# Patient Record
Sex: FEMALE | Race: WHITE | Employment: OTHER | URBAN - METROPOLITAN AREA
[De-identification: names, ages, dates, MRNs, and addresses within clinical notes are randomized per-mention and may not be internally consistent; named-entity substitution may affect disease eponyms.]

---

## 2017-01-18 ENCOUNTER — OFFICE VISIT (OUTPATIENT)
Dept: ORTHOPEDIC SURGERY | Age: 72
End: 2017-01-18

## 2017-01-18 VITALS
HEIGHT: 62 IN | HEART RATE: 61 BPM | DIASTOLIC BLOOD PRESSURE: 65 MMHG | SYSTOLIC BLOOD PRESSURE: 126 MMHG | BODY MASS INDEX: 35.33 KG/M2 | WEIGHT: 192 LBS | RESPIRATION RATE: 18 BRPM

## 2017-01-18 DIAGNOSIS — M54.50 LOW BACK PAIN WITHOUT SCIATICA, UNSPECIFIED BACK PAIN LATERALITY, UNSPECIFIED CHRONICITY: Primary | ICD-10-CM

## 2017-01-18 DIAGNOSIS — M54.14 THORACIC RADICULITIS: ICD-10-CM

## 2017-01-18 DIAGNOSIS — M54.17 LUMBOSACRAL RADICULITIS: ICD-10-CM

## 2017-01-18 DIAGNOSIS — M96.1 POSTLAMINECTOMY SYNDROME, LUMBAR: ICD-10-CM

## 2017-01-18 DIAGNOSIS — M54.17 THORACIC AND LUMBOSACRAL NEURITIS: ICD-10-CM

## 2017-01-18 DIAGNOSIS — M54.14 THORACIC AND LUMBOSACRAL NEURITIS: ICD-10-CM

## 2017-01-18 RX ORDER — PREGABALIN 50 MG/1
50 CAPSULE ORAL 2 TIMES DAILY
Qty: 60 CAP | Refills: 1 | Status: SHIPPED | OUTPATIENT
Start: 2017-01-18 | End: 2017-08-11 | Stop reason: SDUPTHER

## 2017-01-18 RX ORDER — ACETAMINOPHEN/DIPHENHYDRAMINE 500MG-25MG
TABLET ORAL
COMMUNITY

## 2017-01-18 RX ORDER — PREGABALIN 50 MG/1
50 CAPSULE ORAL 2 TIMES DAILY
Qty: 14 CAP | Refills: 0 | Status: SHIPPED | OUTPATIENT
Start: 2017-01-18

## 2017-01-18 NOTE — PROGRESS NOTES
[facility] SPECIALISTS  [address], [place]   Phone: [phone]  Fax: [phone]        PROGRESS NOTE      HISTORY OF PRESENT ILLNESS:  The patient is a 70 y.o. female and was seen today for follow up of low back pain extending laterally into the LLE to the knee an din the RLE extending anteriorly to the knee. This is a patient with lumbar postlaminectomy syndrome with a previous hx of L3-S1 fusion performed by Dr. Oj Melo on 8/22/2011. She underwent right shoulder replacement in May and attended therapy for this. Overall, her mobility has been decreased since and reports she has had other issues that quite frankly have distracted her. At her last clinical appointment,  the patient declined additional treatment. She simply wanted to heal up from her shoulder surgery prior to pursuing additional treatment on her lumbar spine. The patient returns today with chronic low back pain into the BLE extending in roughly an L4/L5 distribution to the knee that has progressed. She rates pain 5-9/10. Pain is exacerbated with standing and walking. Symptoms consistent for stenosis. I last saw patient on 7/25/14 with c/o low back pain into the BLE. Patient is currently taking NEURONTIN 300 mg 1 tab qhs but is unable to tolerate higher doses of medication. She is also prescribed Cymbalta 60 mg. She is not a candidate for Topamax due to hx of glaucoma. She reports her left knee is due for replacement which is being followed by Dr. Lizabeth Sagastume. She has hx of rheumatoid arthritis and is on chronic Prednisone. Patient is followed by Dr. Sharrie Cranker. Patient denies change in bowel or bladder habits.  reviewed.        Past Medical History   Diagnosis Date    Anxiety     Arthritis     Autoimmune disease ([address]      rheumatoid arthritis    Bronchiectasis ([address]     Cataract     Depression     Glaucoma     Hypertension     Stroke [facility])         Social History     Social History    Marital status:      Spouse name: N/A    Number of children: N/A    Years of education: N/A     Occupational History    Not on file. Social History Main Topics    Smoking status: Former Smoker     Packs/day: 1.00     Years: 25.00     Types: Cigarettes     Quit date: 4/3/1996    Smokeless tobacco: Never Used    Alcohol use No    Drug use: No    Sexual activity: Not on file     Other Topics Concern    Not on file     Social History Narrative       Current Outpatient Prescriptions   Medication Sig Dispense Refill    diphenhydrAMINE-acetaminophen (TYLENOL PM EXTRA STRENGTH)  mg tab Take  by mouth.  pregabalin (LYRICA) 50 mg capsule Take 1 Cap by mouth two (2) times a day. Max Daily Amount: 100 mg. 60 Cap 1    pregabalin (LYRICA) 50 mg capsule Take 1 Cap by mouth two (2) times a day. Max Daily Amount: 100 mg. 14 Cap 0    amLODIPine (NORVASC) 5 mg tablet       metoprolol succinate (TOPROL-XL) 100 mg tablet       certolizumab pegol (CIMZIA) 400 mg/2 mL (200 mg/mL x 2) sykt injection by SubCUTAneous route once.  METHOTREXATE by Does Not Apply route. 6 tabs weekly      predniSONE (DELTASONE) 5 mg tablet Take  by mouth daily.  HYDROcodone-acetaminophen (NORCO) 5-325 mg per tablet Take  by mouth.  DORZOLAMIDE HCL/TIMOLOL MALEAT (COSOPT OP) Apply 1 Drop to eye two (2) times a day. Right eye      latanoprost (XALATAN) 0.005 % ophthalmic solution Administer 1 Drop to right eye nightly.  gabapentin (NEURONTIN) 300 mg capsule Take 300 mg by mouth three (3) times daily.  omeprazole (PRILOSEC) 40 mg capsule Take 40 mg by mouth daily.  losartan (COZAAR) 100 mg tablet Take 100 mg by mouth daily.  pravastatin (PRAVACHOL) 40 mg tablet Take 40 mg by mouth nightly.  cloNIDine (CATAPRES) 0.1 mg tablet Take  by mouth two (2) times a day.  DULoxetine (CYMBALTA) 60 mg capsule Take 60 mg by mouth daily.  aspirin 81 mg tablet Take 81 mg by mouth.       CHOLECALCIFEROL, VITAMIN D3, (DIALYVITE VITAMIN D PO) Take  by mouth.  albuterol (PROVENTIL HFA, VENTOLIN HFA, PROAIR HFA) 90 mcg/actuation inhaler Take  by inhalation.  bimatoprost (LUMIGAN) 0.03 % ophthalmic drops Administer 1 Drop to both eyes every evening. Allergies   Allergen Reactions    Combigan [Brimonidine-Timolol] Other (comments)     redness and irritation in eyes    Enbrel [Etanercept] Rash and Swelling    Morphine Unknown (comments)     Makes patient sick        Ambulates with a single point cane. Examined in a wheelchair. PHYSICAL EXAMINATION    Visit Vitals    /65 (BP 1 Location: Right arm, BP Patient Position: Sitting)    Pulse 61    Resp 18    Ht 5' 2\" (1.575 m)    Wt 192 lb (87.1 kg)    BMI 35.12 kg/m2       CONSTITUTIONAL: NAD, A&O x 3  SENSATION: . Hypersensitivty to light touch bilateral lateral thighs. .Intact to light touch throughout  RANGE OF MOTION: The patient has full passive range of motion in all four extremities. MOTOR:  Straight Leg Raise: Negative, bilateral   EXTREMITIES: Arthritic changes, bilateral hands. Limited GTE, left (previous fusion)             Hip Flex Knee Ext Knee Flex Ankle DF GTE Ankle PF Tone   Right +4/5 +4/5 +4/5 +4/5 +4/5 +4/5 +4/5   Left +4/5 +4/5 +4/5 +4/5 Limited  +4/5 +4/5     RADIOGRAPHS  Preliminary reading of lumbar plain films:  Posterior fusion L3-S1. Hardware intact. Fusion appears to be solid. ASSESSMENT   April Payton was seen today for back pain, leg pain and knee pain.     Diagnoses and all orders for this visit:    Low back pain with sciatica, unspecified back pain laterality, unspecified chronicity  -     [68406] L/S 2-3 views  -     MRI LUMB SPINE W WO CONT; Future    Postlaminectomy syndrome, lumbar  -     MRI LUMB SPINE W WO CONT; Future    Thoracic and lumbosacral neuritis  -     MRI LUMB SPINE W WO CONT; Future    Thoracic radiculitis  -     MRI LUMB SPINE W WO CONT; Future    Lumbosacral radiculitis  - MRI LUMB SPINE W WO CONT; Future    Other orders  -     pregabalin (LYRICA) 50 mg capsule; Take 1 Cap by mouth two (2) times a day. Max Daily Amount: 100 mg.  -     pregabalin (LYRICA) 50 mg capsule; Take 1 Cap by mouth two (2) times a day. Max Daily Amount: 100 mg. IMPRESSION AND PLAN:  The patient returns today with similar complaints of chronic low back and radicular symptoms as from when I saw her in July 2014. I will set her up for a lumbar spine MRI. I advised patient to bring copies of films to next visit. In the interim, she will d/c Neurontin 300 mg 1 tab qhs and start Lyrica 50 mg BID. The risks, benefits, and potential side effects of this medication were discussed. Patient understands and wished to proceed. Patient advised to call the office if intolerant to new medication. I have given her a prescription for a rollator. I will see the patient back following MRI. Written by Siobhan Story, as dictated by Vinayak Verduzco MD  I examined the patient, reviewed and agree with the note.

## 2017-01-18 NOTE — MR AVS SNAPSHOT
Visit Information Date & Time Provider Department Dept. Phone Encounter #  
 1/18/2017  3:20 PM Tennille Gill MD [facility] [place] [phone] [id number] Follow-up Instructions Return for following MRI. Follow-up and Disposition History Upcoming Health Maintenance Date Due Hepatitis C Screening 1945 DTaP/Tdap/Td series (1 - Tdap) 12/8/1966 BREAST CANCER SCRN MAMMOGRAM 12/8/1995 FOBT Q 1 YEAR AGE 50-75 12/8/1995 ZOSTER VACCINE AGE 60> 12/8/2005 GLAUCOMA SCREENING Q2Y 12/8/2010 OSTEOPOROSIS SCREENING (DEXA) 12/8/2010 Pneumococcal 65+ Low/Medium Risk (1 of 2 - PCV13) 12/8/2010 MEDICARE YEARLY EXAM 12/8/2010 INFLUENZA AGE 9 TO ADULT 8/1/2016 Allergies as of 1/18/2017  Review Complete On: 1/18/2017 By: Tennille Gill MD  
  
 Severity Noted Reaction Type Reactions Combigan [Brimonidine-timolol]  01/07/2016    Other (comments)  
 redness and irritation in eyes Enbrel [Etanercept]  01/07/2016    Rash, Swelling Morphine  04/03/2013    Unknown (comments) Makes patient sick Current Immunizations  Never Reviewed No immunizations on file. Not reviewed this visit You Were Diagnosed With   
  
 Codes Comments Low back pain without sciatica, unspecified back pain laterality, unspecified chronicity    -  Primary ICD-10-CM: M54.5 ICD-9-CM: 724.2 Postlaminectomy syndrome, lumbar     ICD-10-CM: M96.1 ICD-9-CM: 722.83 Thoracic and lumbosacral neuritis     ICD-10-CM: M54.14, M54.17 ICD-9-CM: 724.4 Thoracic radiculitis     ICD-10-CM: M54.14 
ICD-9-CM: 724.4 Lumbosacral radiculitis     ICD-10-CM: M54.17 ICD-9-CM: 724.4 Vitals BP Pulse Resp Height(growth percentile) Weight(growth percentile) BMI  
 126/65 (BP 1 Location: Right arm, BP Patient Position: Sitting) 61 18 5' 2\" (1.575 m) 192 lb (87.1 kg) 35.12 kg/m2 OB Status Smoking Status Postmenopausal Former Smoker Vitals History BMI and BSA Data Body Mass Index Body Surface Area  
 35.12 kg/m 2 1.95 m 2 Preferred Pharmacy Pharmacy Name Phone [address], [address] [place] [phone] [facility] [phone] Your Updated Medication List  
  
   
This list is accurate as of: 1/18/17  5:42 PM.  Always use your most recent med list.  
  
  
  
  
 albuterol 90 mcg/actuation inhaler Commonly known as:  PROVENTIL HFA, VENTOLIN HFA, PROAIR HFA Take  by inhalation. amLODIPine 5 mg tablet Commonly known as:  NORVASC  
  
 aspirin 81 mg tablet Take 81 mg by mouth. certolizumab pegol 588 mg/2 mL (200 mg/mL x 2) Sykt injection Commonly known as:  CIMZIA  
by SubCUTAneous route once. cloNIDine HCl 0.1 mg tablet Commonly known as:  CATAPRES Take  by mouth two (2) times a day. COSOPT OP Apply 1 Drop to eye two (2) times a day. Right eye COZAAR 100 mg tablet Generic drug:  losartan Take 100 mg by mouth daily. CYMBALTA 60 mg capsule Generic drug:  DULoxetine Take 60 mg by mouth daily. DIALYVITE VITAMIN D PO Take  by mouth.  
  
 gabapentin 300 mg capsule Commonly known as:  NEURONTIN Take 300 mg by mouth three (3) times daily. HYDROcodone-acetaminophen 5-325 mg per tablet Commonly known as:  Holland Minor Take  by mouth.  
  
 latanoprost 0.005 % ophthalmic solution Commonly known as:  Xander Jumbo Administer 1 Drop to right eye nightly. LUMIGAN 0.03 % ophthalmic drops Generic drug:  bimatoprost  
Administer 1 Drop to both eyes every evening. METHOTREXATE  
by Does Not Apply route. 6 tabs weekly  
  
 metoprolol succinate 100 mg tablet Commonly known as:  TOPROL-XL  
  
 omeprazole 40 mg capsule Commonly known as:  PRILOSEC Take 40 mg by mouth daily. pravastatin 40 mg tablet Commonly known as:  PRAVACHOL Take 40 mg by mouth nightly. predniSONE 5 mg tablet Commonly known as:  Cody Lever Take  by mouth daily. * pregabalin 50 mg capsule Commonly known as:  Stann Gabriel Take 1 Cap by mouth two (2) times a day. Max Daily Amount: 100 mg.  
  
 * pregabalin 50 mg capsule Commonly known as:  Stann Gabriel Take 1 Cap by mouth two (2) times a day. Max Daily Amount: 100 mg.  
  
 TYLENOL PM EXTRA STRENGTH  mg Tab Generic drug:  diphenhydrAMINE-acetaminophen Take  by mouth. * Notice: This list has 2 medication(s) that are the same as other medications prescribed for you. Read the directions carefully, and ask your doctor or other care provider to review them with you. Prescriptions Printed Refills  
 pregabalin (LYRICA) 50 mg capsule 1 Sig: Take 1 Cap by mouth two (2) times a day. Max Daily Amount: 100 mg. Class: Print Route: Oral  
 pregabalin (LYRICA) 50 mg capsule 0 Sig: Take 1 Cap by mouth two (2) times a day. Max Daily Amount: 100 mg. Class: Print Route: Oral  
  
We Performed the Following AMB POC XRAY, SPINE, LUMBOSACRAL; 2 O [94396 CPT(R)] Follow-up Instructions Return for following MRI. To-Do List   
 01/25/2017 Imaging:  MRI LUMB SPINE W WO CONT Introducing [facility]! Chapin Hameed introduces GigsJam patient portal. Now you can access parts of your medical record, email your doctor's office, and request medication refills online. 1. In your internet browser, go to [email or web address] 2. Click on the First Time User? Click Here link in the Sign In box. You will see the New Member Sign Up page. 3. Enter your GigsJam Access Code exactly as it appears below. You will not need to use this code after youve completed the sign-up process. If you do not sign up before the expiration date, you must request a new code. · GigsJam Access Code: [email or web address] Expires: 2/1/2017 12:17 PM 
 
 4. Enter the last four digits of your Social Security Number (xxxx) and Date of Birth (mm/dd/yyyy) as indicated and click Submit. You will be taken to the next sign-up page. 5. Create a Worksteady.io ID. This will be your Worksteady.io login ID and cannot be changed, so think of one that is secure and easy to remember. 6. Create a Worksteady.io password. You can change your password at any time. 7. Enter your Password Reset Question and Answer. This can be used at a later time if you forget your password. 8. Enter your e-mail address. You will receive e-mail notification when new information is available in [address] 9. Click Sign Up. You can now view and download portions of your medical record. 10. Click the Download Summary menu link to download a portable copy of your medical information. If you have questions, please visit the Frequently Asked Questions section of the [email or web address] website. Remember, [email or web address].io is NOT to be used for urgent needs. For medical emergencies, dial 911. Now available from your iPhone and Android! Please provide this summary of care documentation to your next provider. Your primary care clinician is listed as Nayeli Cabello. If you have any questions after today's visit, please call [phone].

## 2017-01-20 ENCOUNTER — HOSPITAL ENCOUNTER (OUTPATIENT)
Age: 72
Discharge: HOME | End: 2017-01-20
Attending: PHYSICAL MEDICINE & REHABILITATION
Payer: MEDICARE

## 2017-01-20 DIAGNOSIS — M54.14 THORACIC RADICULITIS: ICD-10-CM

## 2017-01-20 DIAGNOSIS — M54.50 LOW BACK PAIN WITHOUT SCIATICA, UNSPECIFIED BACK PAIN LATERALITY, UNSPECIFIED CHRONICITY: ICD-10-CM

## 2017-01-20 DIAGNOSIS — M54.17 THORACIC AND LUMBOSACRAL NEURITIS: ICD-10-CM

## 2017-01-20 DIAGNOSIS — M54.17 LUMBOSACRAL RADICULITIS: ICD-10-CM

## 2017-01-20 DIAGNOSIS — M96.1 POSTLAMINECTOMY SYNDROME, LUMBAR: ICD-10-CM

## 2017-01-20 DIAGNOSIS — M54.14 THORACIC AND LUMBOSACRAL NEURITIS: ICD-10-CM

## 2017-01-20 LAB — CREAT UR-MCNC: 0.7 MG/DL (ref 0.6–1.3)

## 2017-01-20 PROCEDURE — 74011250636 HC RX REV CODE- 250/636: Performed by: PHYSICAL MEDICINE & REHABILITATION

## 2017-01-20 PROCEDURE — 82565 ASSAY OF CREATININE: CPT

## 2017-01-20 PROCEDURE — 72158 MRI LUMBAR SPINE W/O & W/DYE: CPT

## 2017-01-20 PROCEDURE — A9585 GADOBUTROL INJECTION: HCPCS | Performed by: PHYSICAL MEDICINE & REHABILITATION

## 2017-01-20 RX ADMIN — GADOBUTROL 9 ML: 604.72 INJECTION INTRAVENOUS at 17:00

## 2017-01-24 ENCOUNTER — TELEPHONE (OUTPATIENT)
Dept: ORTHOPEDIC SURGERY | Age: 72
End: 2017-01-24

## 2017-01-24 DIAGNOSIS — M54.14 THORACIC AND LUMBOSACRAL NEURITIS: ICD-10-CM

## 2017-01-24 DIAGNOSIS — M96.1 POSTLAMINECTOMY SYNDROME, LUMBAR: Primary | ICD-10-CM

## 2017-01-24 DIAGNOSIS — M54.17 THORACIC AND LUMBOSACRAL NEURITIS: ICD-10-CM

## 2017-01-24 DIAGNOSIS — M54.14 THORACIC RADICULITIS: ICD-10-CM

## 2017-01-24 DIAGNOSIS — M54.17 LUMBOSACRAL RADICULITIS: ICD-10-CM

## 2017-01-24 NOTE — TELEPHONE ENCOUNTER
Called and informed patient that the order has been printed out for her for the Rolling walker with seat. Patient will come by the office tomorrow to  the order. The verbal order was submitted from the blue sheet dated for 1/18/17 for Rolling walker with seat.

## 2017-01-24 NOTE — TELEPHONE ENCOUNTER
Pt states at her last appt 1/18/17, she was suppose to have printed for her an order for a seated walker. She does not have one in her pprwrk.   Please call pt to confirm  Pt p#[phone]

## 2017-01-31 ENCOUNTER — TELEPHONE (OUTPATIENT)
Dept: ORTHOPEDIC SURGERY | Age: 72
End: 2017-01-31

## 2017-02-03 NOTE — TELEPHONE ENCOUNTER
Called and informed patient that we have received the approval for the Lyrica and she should be able to get it form the pharmacy. Patient verbalized understanding.

## 2017-02-15 ENCOUNTER — OFFICE VISIT (OUTPATIENT)
Dept: ORTHOPEDIC SURGERY | Age: 72
End: 2017-02-15

## 2017-02-15 VITALS
DIASTOLIC BLOOD PRESSURE: 74 MMHG | HEIGHT: 62 IN | SYSTOLIC BLOOD PRESSURE: 148 MMHG | HEART RATE: 64 BPM | RESPIRATION RATE: 18 BRPM | WEIGHT: 192 LBS | BODY MASS INDEX: 35.33 KG/M2

## 2017-02-15 DIAGNOSIS — M54.14 RADICULOPATHY, THORACIC REGION: ICD-10-CM

## 2017-02-15 DIAGNOSIS — M96.1 POSTLAMINECTOMY SYNDROME: ICD-10-CM

## 2017-02-15 DIAGNOSIS — M54.50 LOW BACK PAIN, NON-SPECIFIC: ICD-10-CM

## 2017-02-15 DIAGNOSIS — M54.17 RADICULOPATHY, LUMBOSACRAL REGION: Primary | ICD-10-CM

## 2017-02-15 RX ORDER — TIAGABINE HYDROCHLORIDE 2 MG/1
2 TABLET, FILM COATED ORAL 2 TIMES DAILY WITH MEALS
Qty: 60 TAB | Refills: 1 | Status: SHIPPED | OUTPATIENT
Start: 2017-02-15 | End: 2017-07-10 | Stop reason: ALTCHOICE

## 2017-02-15 NOTE — MR AVS SNAPSHOT
Visit Information Date & Time Provider Department Dept. Phone Encounter #  
 2/15/2017 11:20 AM Erik Baird MD [address] [facility] [address] [facility] [phone] [id number] Follow-up Instructions Return in about 4 weeks (around 3/15/2017). Upcoming Health Maintenance Date Due Hepatitis C Screening 1945 DTaP/Tdap/Td series (1 - Tdap) 12/8/1966 BREAST CANCER SCRN MAMMOGRAM 12/8/1995 FOBT Q 1 YEAR AGE 50-75 12/8/1995 ZOSTER VACCINE AGE 60> 12/8/2005 GLAUCOMA SCREENING Q2Y 12/8/2010 OSTEOPOROSIS SCREENING (DEXA) 12/8/2010 Pneumococcal 65+ Low/Medium Risk (1 of 2 - PCV13) 12/8/2010 MEDICARE YEARLY EXAM 12/8/2010 INFLUENZA AGE 9 TO ADULT 8/1/2016 Allergies as of 2/15/2017  Review Complete On: 2/15/2017 By: Erik Baird MD  
  
 Severity Noted Reaction Type Reactions Combigan [Brimonidine-timolol]  01/07/2016    Other (comments)  
 redness and irritation in eyes Enbrel [Etanercept]  01/07/2016    Rash, Swelling Morphine  04/03/2013    Unknown (comments) Makes patient sick Current Immunizations  Never Reviewed No immunizations on file. Not reviewed this visit You Were Diagnosed With   
  
 Codes Comments Radiculopathy, lumbosacral region    -  Primary ICD-10-CM: M54.17 ICD-9-CM: 724.4 Radiculopathy, thoracic region     ICD-10-CM: M54.14 
ICD-9-CM: 724.4 Postlaminectomy syndrome     ICD-10-CM: M96.1 ICD-9-CM: 722.80 Low back pain, non-specific     ICD-10-CM: M54.5 ICD-9-CM: 724.2 Vitals BP Pulse Resp Height(growth percentile) Weight(growth percentile) BMI  
 148/74 (BP 1 Location: Left arm, BP Patient Position: Sitting) 64 18 5' 2\" (1.575 m) 192 lb (87.1 kg) 35.12 kg/m2 OB Status Smoking Status Postmenopausal Former Smoker Vitals History BMI and BSA Data  Body Mass Index Body Surface Area  
 35.12 kg/m 2 1.95 m 2  
  
  
 Preferred Pharmacy Pharmacy Name Phone [address], [phone] [facility] [phone] Your Updated Medication List  
  
   
This list is accurate as of: 2/15/17 12:42 PM.  Always use your most recent med list.  
  
  
  
  
 albuterol 90 mcg/actuation inhaler Commonly known as:  PROVENTIL HFA, VENTOLIN HFA, PROAIR HFA Take  by inhalation. amLODIPine 5 mg tablet Commonly known as:  NORVASC  
  
 aspirin 81 mg tablet Take 81 mg by mouth. certolizumab pegol 320 mg/2 mL (200 mg/mL x 2) Sykt injection Commonly known as:  CIMZIA  
by SubCUTAneous route once. cloNIDine HCl 0.1 mg tablet Commonly known as:  CATAPRES Take  by mouth two (2) times a day. COSOPT OP Apply 1 Drop to eye two (2) times a day. Right eye COZAAR 100 mg tablet Generic drug:  losartan Take 100 mg by mouth daily. CYMBALTA 60 mg capsule Generic drug:  DULoxetine Take 60 mg by mouth daily. DIALYVITE VITAMIN D PO Take  by mouth.  
  
 gabapentin 300 mg capsule Commonly known as:  NEURONTIN Take 300 mg by mouth three (3) times daily. HYDROcodone-acetaminophen 5-325 mg per tablet Commonly known as:  Leslie Punt Take  by mouth.  
  
 latanoprost 0.005 % ophthalmic solution Commonly known as:  James Gray Administer 1 Drop to right eye nightly. LUMIGAN 0.03 % ophthalmic drops Generic drug:  bimatoprost  
Administer 1 Drop to both eyes every evening. METHOTREXATE  
by Does Not Apply route. 6 tabs weekly  
  
 metoprolol succinate 100 mg tablet Commonly known as:  TOPROL-XL  
  
 omeprazole 40 mg capsule Commonly known as:  PRILOSEC Take 40 mg by mouth daily. pravastatin 40 mg tablet Commonly known as:  PRAVACHOL Take 40 mg by mouth nightly. predniSONE 5 mg tablet Commonly known as:  Manjit Felling Take  by mouth daily. * pregabalin 50 mg capsule Commonly known as:  Hughes Clock Take 1 Cap by mouth two (2) times a day. Max Daily Amount: 100 mg.  
  
 * pregabalin 50 mg capsule Commonly known as:  Alcus Jose Take 1 Cap by mouth two (2) times a day. Max Daily Amount: 100 mg.  
  
 tiaGABine 2 mg tablet Commonly known as:  GABITRIL Take 1 Tab by mouth two (2) times daily (with meals). TYLENOL PM EXTRA STRENGTH  mg Tab Generic drug:  diphenhydrAMINE-acetaminophen Take  by mouth. * Notice: This list has 2 medication(s) that are the same as other medications prescribed for you. Read the directions carefully, and ask your doctor or other care provider to review them with you. Prescriptions Sent to Pharmacy Refills  
 tiaGABine (GABITRIL) 2 mg tablet 1 Sig: Take 1 Tab by mouth two (2) times daily (with meals). Class: Normal  
 Pharmacy: [address] [place] [address] #: [phone] Route: Oral  
  
Follow-up Instructions Return in about 4 weeks (around 3/15/2017). Introducing [facility]! [facility] introduces Instacover patient portal. Now you can access parts of your medical record, email your doctor's office, and request medication refills online. 1. In your internet browser, go to [email or web address] 2. Click on the First Time User? Click Here link in the Sign In box. You will see the New Member Sign Up page. 3. Enter your Instacover Access Code exactly as it appears below. You will not need to use this code after youve completed the sign-up process. If you do not sign up before the expiration date, you must request a new code. · Instacover Access Code: [email or web address] Expires: 5/16/2017 11:21 AM 
 
4. Enter the last four digits of your Social Security Number (xxxx) and Date of Birth (mm/dd/yyyy) as indicated and click Submit. You will be taken to the next sign-up page. 5. Create a Instacover ID.  This will be your Instacover login ID and cannot be changed, so think of one that is secure and easy to remember. 6. Create a Ethonova password. You can change your password at any time. 7. Enter your Password Reset Question and Answer. This can be used at a later time if you forget your password. 8. Enter your e-mail address. You will receive e-mail notification when new information is available in [address] 9. Click Sign Up. You can now view and download portions of your medical record. 10. Click the Download Summary menu link to download a portable copy of your medical information. If you have questions, please visit the Frequently Asked Questions section of the Ethonova website. Remember, Ethonova is NOT to be used for urgent needs. For medical emergencies, dial 911. Now available from your iPhone and Android! Please provide this summary of care documentation to your next provider. Your primary care clinician is listed as Katelin Keita. If you have any questions after today's visit, please call [phone].

## 2017-02-15 NOTE — PROGRESS NOTES
[facility]  [address], [place]   Phone: [phone]  Fax: [phone]        PROGRESS NOTE      HISTORY OF PRESENT ILLNESS:  The patient is a 70 y.o. female and was seen today for follow up of chronic low back pain into the BLE extending in roughly an L4/L5 distribution to the knee that has progressed. Pain is exacerbated with standing and walking. Symptoms consistent for stenosis. This is a patient with lumbar postlaminectomy syndrome with a previous hx of L3-S1 fusion performed by Dr. Jasmin Mead on 8/22/2011. She underwent right shoulder replacement in May 2014 and attended therapy for this. Overall, her mobility decreased since and reports she has had other issues that quite frankly have distracted her. Patient reports intolerance to higher doses of NEURONTIN 300 mg 1 tab qhs. She is also prescribed Cymbalta 60 mg. She is not a candidate for Topamax due to hx of glaucoma. She reports her left knee is due for replacement which is being followed by Dr. Nito Yang. She has hx of rheumatoid arthritis and is on chronic Prednisone. Patient is followed by Dr. Margo Salcedo. Preliminary reading of lumbar plain films revealed posterior fusion L3-S1. Hardware intact. Fusion appears to be solid. At her last clinical appointment, patient returned with similar complaints of chronic low back and radicular symptoms as from when I saw her in July 2014. I set her up for a lumbar spine MRI. In the interim, she should d/c Neurontin 300 mg 1 tab qhs and start Lyrica 50 mg BID. I gave her a prescription for a rollator. The patient returns today with pain location and distribution remain unchanged. She rates pain 6-10/10, increased since her last visit (5-9/10). She is unable to afford Lyrica. Patient is currently taking Cymbalta 60 mg as prescribed by another physician. Patient states she uses her rollator occasionally with benefit.  She reports Dr. Nito Yang follows her for her left knee with possibility of left TKR in the future. Lumbar spine MRI dated 1/20/17 was reviewed. Per report, able multilevel caudal lumbar fusion. Diffuse bulging disc annulus at L4/L5 results in mild to moderate symmetric soft tissue exit foramen stenosis at this level. Fusion hardware is well anchored, fusion appears solid. Mild bulging disc annulus, abve fusion level at L2/L3, without significant interval progression since the prior exam. No te is made of a congenitally generous bony canal and thecal sac.  reviewed. Past Medical History   Diagnosis Date    Anxiety     Arthritis     Autoimmune disease ([facility] [address]      rheumatoid arthritis    Bronchiectasis ([facility] [address]     Cataract     Depression     Glaucoma     Hypertension     Stroke [facility])         Social History     Social History    Marital status:      Spouse name: N/A    Number of children: N/A    Years of education: N/A     Occupational History    Not on file. Social History Main Topics    Smoking status: Former Smoker     Packs/day: 1.00     Years: 25.00     Types: Cigarettes     Quit date: 4/3/1996    Smokeless tobacco: Never Used    Alcohol use No    Drug use: No    Sexual activity: Not on file     Other Topics Concern    Not on file     Social History Narrative       Current Outpatient Prescriptions   Medication Sig Dispense Refill    tiaGABine (GABITRIL) 2 mg tablet Take 1 Tab by mouth two (2) times daily (with meals). 60 Tab 1    diphenhydrAMINE-acetaminophen (TYLENOL PM EXTRA STRENGTH)  mg tab Take  by mouth.  amLODIPine (NORVASC) 5 mg tablet       metoprolol succinate (TOPROL-XL) 100 mg tablet       certolizumab pegol (CIMZIA) 400 mg/2 mL (200 mg/mL x 2) sykt injection by SubCUTAneous route once.  predniSONE (DELTASONE) 5 mg tablet Take  by mouth daily.  HYDROcodone-acetaminophen (NORCO) 5-325 mg per tablet Take  by mouth.       DORZOLAMIDE HCL/TIMOLOL MALEAT (COSOPT OP) Apply 1 Drop to eye two (2) times a day. Right eye      latanoprost (XALATAN) 0.005 % ophthalmic solution Administer 1 Drop to right eye nightly.  omeprazole (PRILOSEC) 40 mg capsule Take 40 mg by mouth daily.  losartan (COZAAR) 100 mg tablet Take 100 mg by mouth daily.  pravastatin (PRAVACHOL) 40 mg tablet Take 40 mg by mouth nightly.  cloNIDine (CATAPRES) 0.1 mg tablet Take  by mouth two (2) times a day.  DULoxetine (CYMBALTA) 60 mg capsule Take 60 mg by mouth daily.  bimatoprost (LUMIGAN) 0.03 % ophthalmic drops Administer 1 Drop to both eyes every evening.  aspirin 81 mg tablet Take 81 mg by mouth.  pregabalin (LYRICA) 50 mg capsule Take 1 Cap by mouth two (2) times a day. Max Daily Amount: 100 mg. 60 Cap 1    pregabalin (LYRICA) 50 mg capsule Take 1 Cap by mouth two (2) times a day. Max Daily Amount: 100 mg. 14 Cap 0    METHOTREXATE by Does Not Apply route. 6 tabs weekly      CHOLECALCIFEROL, VITAMIN D3, (DIALYVITE VITAMIN D PO) Take  by mouth.  albuterol (PROVENTIL HFA, VENTOLIN HFA, PROAIR HFA) 90 mcg/actuation inhaler Take  by inhalation.  gabapentin (NEURONTIN) 300 mg capsule Take 300 mg by mouth three (3) times daily. Allergies   Allergen Reactions    Combigan [Brimonidine-Timolol] Other (comments)     redness and irritation in eyes    Enbrel [Etanercept] Rash and Swelling    Morphine Unknown (comments)     Makes patient sick        Ambulates with a single point cane. PHYSICAL EXAMINATION    Visit Vitals    /74 (BP 1 Location: Left arm, BP Patient Position: Sitting)    Pulse 64    Resp 18    Ht 5' 2\" (1.575 m)    Wt 192 lb (87.1 kg)    BMI 35.12 kg/m2       CONSTITUTIONAL: NAD, A&O x 3  SENSATION: Decreased sensation to light touch at L4 of the RLE. Sensation to light touch otherwise intact. RANGE OF MOTION: The patient has full passive range of motion in all four extremities.   MOTOR:  Straight Leg Raise: Negative, bilateral               Hip Flex Knee Ext Knee Flex Ankle DF GTE Ankle PF Tone   Right +4/5 +4/5 +4/5 +4/5 +4/5 +4/5 +4/5   Left +4/5 +4/5 +4/5 +4/5 +4/5 +4/5 +4/5       ASSESSMENT   Vivi Leonardo was seen today for back pain and leg pain. Diagnoses and all orders for this visit:    Radiculopathy, lumbosacral region    Radiculopathy, thoracic region    Postlaminectomy syndrome    Low back pain, non-specific    Other orders  -     tiaGABine (GABITRIL) 2 mg tablet; Take 1 Tab by mouth two (2) times daily (with meals). IMPRESSION AND PLAN:  She appears to have residual pain from her L3-S1 fusion. She declines lumbar blocks at this time. We discussed the option of a spinal cord stimulator. Patient discontinued Lyrica due to finances. I will try her on Gabitril 2 mg BID. The risks, benefits, and potential side effects of this medication were discussed. Patient understands and wished to proceed. Patient advised to call the office if intolerant to new medication. I will see the patient back in 1 month's time or earlier if needed. Written by Ofelia Leung, as dictated by Jovita Zeng MD  I examined the patient, reviewed and agree with the note.

## 2017-03-15 ENCOUNTER — OFFICE VISIT (OUTPATIENT)
Dept: ORTHOPEDIC SURGERY | Age: 72
End: 2017-03-15

## 2017-03-15 VITALS
WEIGHT: 183.2 LBS | HEIGHT: 62 IN | RESPIRATION RATE: 16 BRPM | SYSTOLIC BLOOD PRESSURE: 96 MMHG | BODY MASS INDEX: 33.71 KG/M2 | HEART RATE: 63 BPM | DIASTOLIC BLOOD PRESSURE: 64 MMHG

## 2017-03-15 DIAGNOSIS — M54.14 RADICULOPATHY, THORACIC REGION: ICD-10-CM

## 2017-03-15 DIAGNOSIS — M54.17 RADICULOPATHY, LUMBOSACRAL REGION: ICD-10-CM

## 2017-03-15 DIAGNOSIS — M96.1 POSTLAMINECTOMY SYNDROME: Primary | ICD-10-CM

## 2017-03-15 RX ORDER — TIAGABINE HYDROCHLORIDE 4 MG/1
4 TABLET, FILM COATED ORAL
Qty: 60 TAB | Refills: 1 | Status: SHIPPED | OUTPATIENT
Start: 2017-03-15 | End: 2017-03-15 | Stop reason: CLARIF

## 2017-03-15 RX ORDER — TIAGABINE HYDROCHLORIDE 4 MG/1
4 TABLET, FILM COATED ORAL 2 TIMES DAILY WITH MEALS
Qty: 60 TAB | Refills: 1 | Status: SHIPPED | OUTPATIENT
Start: 2017-03-15 | End: 2017-04-14 | Stop reason: SDUPTHER

## 2017-03-15 RX ORDER — TIAGABINE HYDROCHLORIDE 4 MG/1
TABLET, FILM COATED ORAL
Qty: 90 TAB | Refills: 1 | Status: SHIPPED | OUTPATIENT
Start: 2017-03-15 | End: 2017-07-10 | Stop reason: ALTCHOICE

## 2017-03-15 NOTE — PROGRESS NOTES
[facility]  [address], [place]   Phone: [phone]  Fax: [phone]        PROGRESS NOTE      HISTORY OF PRESENT ILLNESS:  The patient is a 70 y.o. female and was seen today for follow up of chronic progressive low back pain into the BLE extending in roughly an L4/L5 distribution to the knee. Pain is exacerbated with standing and walking. Symptoms consistent for stenosis. This is a patient with lumbar postlaminectomy syndrome with a previous h/o L3-S1 fusion performed by Dr. Margarette Vaughn on 8/22/2011. She underwent right shoulder replacement in May 2014 and attended therapy for this. Overall, her mobility decreased since and reports she has had other issues that quite frankly have distracted her. Pt states she uses her rollator occasionally with benefit. She has h/o rheumatoid arthritis and is on chronic Prednisone. Patient is followed by Dr. Lesvia Stern. Patient reports intolerance to higher doses of NEURONTIN 300 mg 1 tab qhs. She is prescribed Cymbalta 60 mg by another physician. She is not a candidate for Topamax due to h/o glaucoma. Pt is unable to afford Lyrica. Preliminary reading of lumbar plain films revealed posterior fusion L3-S1. Hardware intact. Fusion appears to be solid. Lumbar spine MRI dated 1/20/17 was reviewed. Per report, able multilevel caudal lumbar fusion. Diffuse bulging disc annulus at L4/L5 results in mild to moderate symmetric soft tissue exit foramen stenosis at this level. Fusion hardware is well anchored, fusion appears solid. Mild bulging disc annulus, above fusion level at L2/L3, without significant interval progression since the prior exam. 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 At her last clinical appointment, she appeared to have residual pain from her L3-S1 fusion. She declines lumbar blocks at this time. We discussed the option of a spinal cord stimulator. Patient discontinued Lyrica due to finances.  I tried her on Gabitril 2 mg BID. The patient returns today with pain location and distribution remain unchanged. She rates pain 4/10, an improvement since her last visit (6-10/10). She is tolerating Gabitril 2 mg BID with moderate benefit. Pt states she is not pain free but notes symptoms are less frequent than before. Note from Dr. Eriberto Pickens dated 2/21/17 indicating patient was seen with a pain level of 4/10. Of note, she had forgotten to take her Methotrexate without an increase in pain and was doing well from a RA standpoint. Her chief complaint was of left knee and low back pain at that time. Left TKR pending to be performed by Dr. Laila Blunt.  reviewed. Past Medical History:   Diagnosis Date    Anxiety     Arthritis     Autoimmune disease ([facility] [address]     rheumatoid arthritis    Bronchiectasis ([facility] [address]     Cataract     Depression     Glaucoma     Hypertension     Stroke [facility])         Social History     Social History    Marital status:      Spouse name: N/A    Number of children: N/A    Years of education: N/A     Occupational History    Not on file. Social History Main Topics    Smoking status: Former Smoker     Packs/day: 1.00     Years: 25.00     Types: Cigarettes     Quit date: 4/3/1996    Smokeless tobacco: Never Used    Alcohol use No    Drug use: No    Sexual activity: Not on file     Other Topics Concern    Not on file     Social History Narrative       Current Outpatient Prescriptions   Medication Sig Dispense Refill    tiaGABine (GABITRIL) 4 mg tablet Take 1 Tab by mouth two (2) times daily (with meals). 60 Tab 1    tiaGABine (GABITRIL) 2 mg tablet Take 1 Tab by mouth two (2) times daily (with meals). 60 Tab 1    diphenhydrAMINE-acetaminophen (TYLENOL PM EXTRA STRENGTH)  mg tab Take  by mouth.       amLODIPine (NORVASC) 5 mg tablet       metoprolol succinate (TOPROL-XL) 100 mg tablet       certolizumab pegol (CIMZIA) 400 mg/2 mL (200 mg/mL x 2) sykt injection by SubCUTAneous route once.  predniSONE (DELTASONE) 5 mg tablet Take  by mouth daily.  CHOLECALCIFEROL, VITAMIN D3, (DIALYVITE VITAMIN D PO) Take  by mouth.  DORZOLAMIDE HCL/TIMOLOL MALEAT (COSOPT OP) Apply 1 Drop to eye two (2) times a day. Right eye      latanoprost (XALATAN) 0.005 % ophthalmic solution Administer 1 Drop to right eye nightly.  omeprazole (PRILOSEC) 40 mg capsule Take 40 mg by mouth daily.  losartan (COZAAR) 100 mg tablet Take 100 mg by mouth daily.  pravastatin (PRAVACHOL) 40 mg tablet Take 40 mg by mouth nightly.  cloNIDine (CATAPRES) 0.1 mg tablet Take  by mouth two (2) times a day.  DULoxetine (CYMBALTA) 60 mg capsule Take 60 mg by mouth daily.  bimatoprost (LUMIGAN) 0.03 % ophthalmic drops Administer 1 Drop to both eyes every evening.  aspirin 81 mg tablet Take 81 mg by mouth.  pregabalin (LYRICA) 50 mg capsule Take 1 Cap by mouth two (2) times a day. Max Daily Amount: 100 mg. 60 Cap 1    pregabalin (LYRICA) 50 mg capsule Take 1 Cap by mouth two (2) times a day. Max Daily Amount: 100 mg. 14 Cap 0    METHOTREXATE by Does Not Apply route. 6 tabs weekly      HYDROcodone-acetaminophen (NORCO) 5-325 mg per tablet Take  by mouth.  albuterol (PROVENTIL HFA, VENTOLIN HFA, PROAIR HFA) 90 mcg/actuation inhaler Take  by inhalation.  gabapentin (NEURONTIN) 300 mg capsule Take 300 mg by mouth three (3) times daily. Allergies   Allergen Reactions    Combigan [Brimonidine-Timolol] Other (comments)     redness and irritation in eyes    Enbrel [Etanercept] Rash and Swelling    Morphine Unknown (comments)     Makes patient sick        Ambulates with a single point cane.   PHYSICAL EXAMINATION    Visit Vitals    BP 96/64 (BP 1 Location: Left arm, BP Patient Position: Sitting)    Pulse 63    Resp 16    Ht 5' 2\" (1.575 m)    Wt 183 lb 3.2 oz (83.1 kg)    BMI 33.51 kg/m2       CONSTITUTIONAL: NAD, A&O x 3  SENSATION: Decreased sensation to light touch at L4/L5 of the RLE. Sensation to light touch otherwise intact. RANGE OF MOTION: The patient has full passive range of motion in all four extremities. MOTOR:  Straight Leg Raise: Negative, bilateral               Hip Flex Knee Ext Knee Flex Ankle DF GTE Ankle PF Tone   Right +4/5 +4/5 +4/5 +4/5 +4/5 +4/5 +4/5   Left +4/5 +4/5 +4/5 +4/5 +4/5 +4/5 +4/5       ASSESSMENT   June Calderon was seen today for back pain and leg pain. Diagnoses and all orders for this visit:    Postlaminectomy syndrome    Radiculopathy, thoracic region    Radiculopathy, lumbosacral region    Other orders  -     Discontinue: tiaGABine (GABITRIL) 4 mg tablet; Take 1 Tab by mouth Daily (before breakfast). -     tiaGABine (GABITRIL) 4 mg tablet; Take 1 Tab by mouth two (2) times daily (with meals). IMPRESSION AND PLAN:  She is tolerating Gabitril 2 mg BID with slight benefit. I will increase her dose to 4 mg BID. Patient advised to call the office if intolerant to higher dose. I will see the patient back in 1 month's time or earlier if needed. Written by Jany Singletary, as dictated by Angela Vázquez MD  I examined the patient, reviewed and agree with the note.

## 2017-03-27 NOTE — TELEPHONE ENCOUNTER
Called and spoke to one of the pharmacy techs and was informed that the patient Gabitril 2 mg has been filled and is ready for pickup and the Gabitril 4 mg BID can not be filled until 4/7/17.

## 2017-03-27 NOTE — TELEPHONE ENCOUNTER
PT CALLING IN STATES THAT SHE IS HAVING A HARD TIME REGARDING GETTING A REFILL OF tiaGABine (GABITRIL) 4 mg tablet  STATES THAT THE PHARMACY IS TELLING HER THAT THE INSURANCE WILL NOT COVER IT DUE TO THE WAY IT IS WRITTEN, PLEASE CALL THE PATIENT AND ADVISE, THANK YOU.

## 2017-03-27 NOTE — TELEPHONE ENCOUNTER
Called and left voice mail informing patient that I have contacted her pharmacy and they refilled her Gabitril 2 mg already and that is ready for  and the new prescription will not be able to be filled until 4/7/17. So, she can just double up on the medication for the increase until she can get the new prescription.

## 2017-04-14 ENCOUNTER — OFFICE VISIT (OUTPATIENT)
Dept: ORTHOPEDIC SURGERY | Age: 72
End: 2017-04-14

## 2017-04-14 VITALS
HEART RATE: 65 BPM | DIASTOLIC BLOOD PRESSURE: 69 MMHG | BODY MASS INDEX: 34.23 KG/M2 | HEIGHT: 62 IN | RESPIRATION RATE: 12 BRPM | WEIGHT: 186 LBS | SYSTOLIC BLOOD PRESSURE: 153 MMHG

## 2017-04-14 DIAGNOSIS — M96.1 POSTLAMINECTOMY SYNDROME, LUMBAR: Primary | ICD-10-CM

## 2017-04-14 DIAGNOSIS — M54.17 RADICULOPATHY, LUMBOSACRAL REGION: ICD-10-CM

## 2017-04-14 DIAGNOSIS — M54.14 RADICULOPATHY, THORACIC REGION: ICD-10-CM

## 2017-04-14 RX ORDER — TIAGABINE HYDROCHLORIDE 4 MG/1
4 TABLET, FILM COATED ORAL 2 TIMES DAILY WITH MEALS
Qty: 60 TAB | Refills: 2 | Status: SHIPPED | OUTPATIENT
Start: 2017-04-14 | End: 2017-07-10 | Stop reason: SDUPTHER

## 2017-04-14 NOTE — MR AVS SNAPSHOT
Visit Information Date & Time Provider Department Dept. Phone Encounter #  
 4/14/2017  1:05 PM Bernard Arredondo MD [facility] [place] [id number] Follow-up Instructions Return in about 3 months (around 7/14/2017). Upcoming Health Maintenance Date Due Hepatitis C Screening 1945 DTaP/Tdap/Td series (1 - Tdap) 12/8/1966 BREAST CANCER SCRN MAMMOGRAM 12/8/1995 FOBT Q 1 YEAR AGE 50-75 12/8/1995 ZOSTER VACCINE AGE 60> 12/8/2005 GLAUCOMA SCREENING Q2Y 12/8/2010 OSTEOPOROSIS SCREENING (DEXA) 12/8/2010 Pneumococcal 65+ Low/Medium Risk (1 of 2 - PCV13) 12/8/2010 MEDICARE YEARLY EXAM 12/8/2010 INFLUENZA AGE 9 TO ADULT 8/1/2016 Allergies as of 4/14/2017  Review Complete On: 4/14/2017 By: Bernard Arredondo MD  
  
 Severity Noted Reaction Type Reactions Combigan [Brimonidine-timolol]  01/07/2016    Other (comments)  
 redness and irritation in eyes Enbrel [Etanercept]  01/07/2016    Rash, Swelling Morphine  04/03/2013    Unknown (comments) Makes patient sick Current Immunizations  Never Reviewed No immunizations on file. Not reviewed this visit You Were Diagnosed With   
  
 Codes Comments Postlaminectomy syndrome, lumbar    -  Primary ICD-10-CM: M96.1 ICD-9-CM: 722.83 Radiculopathy, lumbosacral region     ICD-10-CM: M54.17 ICD-9-CM: 724.4 Radiculopathy, thoracic region     ICD-10-CM: M54.14 
ICD-9-CM: 724.4 Vitals BP Pulse Resp Height(growth percentile) Weight(growth percentile) BMI  
 153/69 65 12 5' 2\" (1.575 m) 186 lb (84.4 kg) 34.02 kg/m2 OB Status Smoking Status Postmenopausal Former Smoker Vitals History BMI and BSA Data Body Mass Index Body Surface Area 34.02 kg/m 2 1.92 m 2 Preferred Pharmacy Pharmacy Name Phone  [address] [phone] Your Updated Medication List  
  
   
This list is accurate as of: 4/14/17  1:59 PM.  Always use your most recent med list.  
  
  
  
  
 albuterol 90 mcg/actuation inhaler Commonly known as:  PROVENTIL HFA, VENTOLIN HFA, PROAIR HFA Take  by inhalation. amLODIPine 5 mg tablet Commonly known as:  NORVASC  
  
 aspirin 81 mg tablet Take 162 mg by mouth. certolizumab pegol 883 mg/2 mL (200 mg/mL x 2) Sykt injection Commonly known as:  CIMZIA  
by SubCUTAneous route once. cloNIDine HCl 0.1 mg tablet Commonly known as:  CATAPRES Take  by mouth two (2) times a day. COSOPT OP Apply 1 Drop to eye two (2) times a day. Right eye COZAAR 100 mg tablet Generic drug:  losartan Take 100 mg by mouth daily. CYMBALTA 60 mg capsule Generic drug:  DULoxetine Take 60 mg by mouth daily. DIALYVITE VITAMIN D PO Take  by mouth.  
  
 gabapentin 300 mg capsule Commonly known as:  NEURONTIN Take 300 mg by mouth three (3) times daily. HYDROcodone-acetaminophen 5-325 mg per tablet Commonly known as:  Elyn Barley Take  by mouth.  
  
 latanoprost 0.005 % ophthalmic solution Commonly known as:  Painter Bud Administer 1 Drop to right eye nightly. LUMIGAN 0.03 % ophthalmic drops Generic drug:  bimatoprost  
Administer 1 Drop to right eye every evening. METHOTREXATE  
by Does Not Apply route. 6 tabs weekly  
  
 metoprolol succinate 100 mg tablet Commonly known as:  TOPROL-XL  
  
 omeprazole 40 mg capsule Commonly known as:  PRILOSEC Take 40 mg by mouth daily. pravastatin 40 mg tablet Commonly known as:  PRAVACHOL Take 40 mg by mouth nightly. predniSONE 5 mg tablet Commonly known as:  Izora Kvng Take  by mouth daily. * pregabalin 50 mg capsule Commonly known as:  Patrice Pete Take 1 Cap by mouth two (2) times a day. Max Daily Amount: 100 mg.  
  
 * pregabalin 50 mg capsule Commonly known as:  Azar Lily Take 1 Cap by mouth two (2) times a day. Max Daily Amount: 100 mg.  
  
 * tiaGABine 2 mg tablet Commonly known as:  GABITRIL Take 1 Tab by mouth two (2) times daily (with meals). * tiaGABine 4 mg tablet Commonly known as:  GABITRIL  
TAKE 1 TABLET BY MOUTH DAILY BEFORE BREAKFAST * tiaGABine 4 mg tablet Commonly known as:  GABITRIL Take 1 Tab by mouth two (2) times daily (with meals). TYLENOL PM EXTRA STRENGTH  mg Tab Generic drug:  diphenhydrAMINE-acetaminophen Take  by mouth. * Notice: This list has 5 medication(s) that are the same as other medications prescribed for you. Read the directions carefully, and ask your doctor or other care provider to review them with you. Prescriptions Sent to Pharmacy Refills  
 tiaGABine (GABITRIL) 4 mg tablet 2 Sig: Take 1 Tab by mouth two (2) times daily (with meals). Class: Normal  
 Pharmacy: [address] [place] [address] #: [phone] Route: Oral  
  
Follow-up Instructions Return in about 3 months (around 7/14/2017). Introducing [facility]! Toribio Nyhan introduces [employer] patient portal. Now you can access parts of your medical record, email your doctor's office, and request medication refills online. 1. In your internet browser, go to [email or web address] 2. Click on the First Time User? Click Here link in the Sign In box. You will see the New Member Sign Up page. 3. Enter your [employer] Access Code exactly as it appears below. You will not need to use this code after youve completed the sign-up process. If you do not sign up before the expiration date, you must request a new code. · [employer] Access Code: [email or web address] Expires: 5/16/2017 12:21 PM 
 
4.  Enter the last four digits of your Social Security Number (xxxx) and Date of Birth (mm/dd/yyyy) as indicated and click Submit. You will be taken to the next sign-up page. 5. Create a [employer] ID. This will be your [employer] login ID and cannot be changed, so think of one that is secure and easy to remember. 6. Create a [employer] password. You can change your password at any time. 7. Enter your Password Reset Question and Answer. This can be used at a later time if you forget your password. 8. Enter your e-mail address. You will receive e-mail notification when new information is available in [address] 9. Click Sign Up. You can now view and download portions of your medical record. 10. Click the Download Summary menu link to download a portable copy of your medical information. If you have questions, please visit the Frequently Asked Questions section of the [employer] website. Remember, [employer] is NOT to be used for urgent needs. For medical emergencies, dial 911. Now available from your iPhone and Android! Please provide this summary of care documentation to your next provider. Your primary care clinician is listed as Lee Gagnon. If you have any questions after today's visit, please call [phone].

## 2017-04-14 NOTE — PROGRESS NOTES
[facility] SPECIALISTS  [address]  [address], [address]   Phone: [phone]  Fax: [phone]        PROGRESS NOTE      HISTORY OF PRESENT ILLNESS:  The patient is a 70 y.o. female and was seen today for follow up of chronic progressive low back pain into the BLE extending in roughly an L4/L5 distribution to the knee. Pain is exacerbated with standing and walking. Symptoms consistent for stenosis. Her chief complaint was of left knee and low back pain at that time. This is a patient with lumbar postlaminectomy syndrome with a previous h/o L3-S1 fusion performed by Dr. Shannan Watkins on 8/22/2011. She underwent right shoulder replacement in May 2014 and attended therapy for this. Overall, her mobility decreased since and reports she has had other issues that quite frankly have distracted her. Pt states she uses her rollator occasionally with benefit. She has h/o rheumatoid arthritis and is on chronic Prednisone. Patient is followed by Dr. Ranjana Alvarado. Patient reports intolerance to higher doses of NEURONTIN 300 mg 1 tab qhs. She is not a candidate for Topamax due to h/o glaucoma. Pt is unable to afford Lyrica. She is prescribed Cymbalta 60 mg by another physician. Pt is tolerating Gabitril 2 mg BID with moderate benefit. Pt states she is not pain free but notes symptoms are less frequent than before. Preliminary reading of lumbar plain films revealed posterior fusion L3-S1. Hardware intact. Fusion appears to be solid. Lumbar spine MRI dated 1/20/17 was reviewed. Per report, able multilevel caudal lumbar fusion. Diffuse bulging disc annulus at L4/L5 results in mild to moderate symmetric soft tissue exit foramen stenosis at this level. Fusion hardware is well anchored, fusion appears solid. Mild bulging disc annulus, above fusion level at L2/L3, without significant interval progression since the prior exam. 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  At her last clinical appointment, she was tolerating Gabitril 2 mg BID with slight benefit. I increased her dose to 4 mg BID. The patient returns today with pain location and distribution remain unchanged. She rates pain 3/10, a slight decrease since her last visit (4/10). Per patient, left TKR is pending to be performed by Dr. Vicki Obando on 6/23/17. She is tolerating increased Gabitril 4 mg BID with benefit.  reviewed. Past Medical History:   Diagnosis Date    Anxiety     Arthritis     Autoimmune disease [address] [address]     rheumatoid arthritis    Bronchiectasis ([facility] [address]     Cataract     Depression     Glaucoma     Hypertension     Stroke [facility])         Social History     Social History    Marital status:      Spouse name: N/A    Number of children: N/A    Years of education: N/A     Occupational History    Not on file. Social History Main Topics    Smoking status: Former Smoker     Packs/day: 1.00     Years: 25.00     Types: Cigarettes     Quit date: 4/3/1996    Smokeless tobacco: Never Used    Alcohol use No    Drug use: No    Sexual activity: Not on file     Other Topics Concern    Not on file     Social History Narrative       Current Outpatient Prescriptions   Medication Sig Dispense Refill    tiaGABine (GABITRIL) 4 mg tablet Take 1 Tab by mouth two (2) times daily (with meals). 60 Tab 2    diphenhydrAMINE-acetaminophen (TYLENOL PM EXTRA STRENGTH)  mg tab Take  by mouth.  amLODIPine (NORVASC) 5 mg tablet       metoprolol succinate (TOPROL-XL) 100 mg tablet       certolizumab pegol (CIMZIA) 400 mg/2 mL (200 mg/mL x 2) sykt injection by SubCUTAneous route once.  predniSONE (DELTASONE) 5 mg tablet Take  by mouth daily.  CHOLECALCIFEROL, VITAMIN D3, (DIALYVITE VITAMIN D PO) Take  by mouth.  HYDROcodone-acetaminophen (NORCO) 5-325 mg per tablet Take  by mouth.  DORZOLAMIDE HCL/TIMOLOL MALEAT (COSOPT OP) Apply 1 Drop to eye two (2) times a day.  Right eye      latanoprost (XALATAN) 0.005 % ophthalmic solution Administer 1 Drop to right eye nightly.  albuterol (PROVENTIL HFA, VENTOLIN HFA, PROAIR HFA) 90 mcg/actuation inhaler Take  by inhalation.  omeprazole (PRILOSEC) 40 mg capsule Take 40 mg by mouth daily.  losartan (COZAAR) 100 mg tablet Take 100 mg by mouth daily.  pravastatin (PRAVACHOL) 40 mg tablet Take 40 mg by mouth nightly.  cloNIDine (CATAPRES) 0.1 mg tablet Take  by mouth two (2) times a day.  DULoxetine (CYMBALTA) 60 mg capsule Take 60 mg by mouth daily.  bimatoprost (LUMIGAN) 0.03 % ophthalmic drops Administer 1 Drop to right eye every evening.  aspirin 81 mg tablet Take 162 mg by mouth.  tiaGABine (GABITRIL) 4 mg tablet TAKE 1 TABLET BY MOUTH DAILY BEFORE BREAKFAST 90 Tab 1    tiaGABine (GABITRIL) 2 mg tablet Take 1 Tab by mouth two (2) times daily (with meals). 60 Tab 1    pregabalin (LYRICA) 50 mg capsule Take 1 Cap by mouth two (2) times a day. Max Daily Amount: 100 mg. 60 Cap 1    pregabalin (LYRICA) 50 mg capsule Take 1 Cap by mouth two (2) times a day. Max Daily Amount: 100 mg. 14 Cap 0    METHOTREXATE by Does Not Apply route. 6 tabs weekly      gabapentin (NEURONTIN) 300 mg capsule Take 300 mg by mouth three (3) times daily. Allergies   Allergen Reactions    Combigan [Brimonidine-Timolol] Other (comments)     redness and irritation in eyes    Enbrel [Etanercept] Rash and Swelling    Morphine Unknown (comments)     Makes patient sick        Ambulates with a single point cane. PHYSICAL EXAMINATION    Visit Vitals    /69    Pulse 65    Resp 12    Ht 5' 2\" (1.575 m)    Wt 186 lb (84.4 kg)    BMI 34.02 kg/m2       CONSTITUTIONAL: NAD, A&O x 3  SENSATION: Decreased sensation to light touch at L4/L5 of the RLE. Sensation to light touch otherwise intact. RANGE OF MOTION: The patient has full passive range of motion in all four extremities.   MOTOR:  Straight Leg Raise: Negative, bilateral Hip Flex Knee Ext Knee Flex Ankle DF GTE Ankle PF Tone   Right +4/5 +4/5 +4/5 +4/5 +4/5 +4/5 +4/5   Left +4/5 +4/5 +4/5 +4/5 +4/5 +4/5 +4/5       ASSESSMENT   Angelique Greenfield was seen today for back pain, leg pain and follow-up. Diagnoses and all orders for this visit:    Postlaminectomy syndrome, lumbar  -     tiaGABine (GABITRIL) 4 mg tablet; Take 1 Tab by mouth two (2) times daily (with meals). Radiculopathy, lumbosacral region  -     tiaGABine (GABITRIL) 4 mg tablet; Take 1 Tab by mouth two (2) times daily (with meals). Radiculopathy, thoracic region  -     tiaGABine (GABITRIL) 4 mg tablet; Take 1 Tab by mouth two (2) times daily (with meals). IMPRESSION AND PLAN:  I did offer to increase her Gabitril 4 mg dose to TID. She declines at this time. Patient wished to continue her current treatment. She was provided with refills of Gabitril 4 mg BID. Patient should proceed with left TKR as scheduled for June. I will see the patient back in 3 month's time or earlier if needed. Written by Bebe Murcia, as dictated by Cassie Santos MD  I examined the patient, reviewed and agree with the note.

## 2017-07-10 DIAGNOSIS — M96.1 POSTLAMINECTOMY SYNDROME, LUMBAR: ICD-10-CM

## 2017-07-10 DIAGNOSIS — M54.17 RADICULOPATHY, LUMBOSACRAL REGION: ICD-10-CM

## 2017-07-10 DIAGNOSIS — M54.14 RADICULOPATHY, THORACIC REGION: ICD-10-CM

## 2017-07-10 RX ORDER — TIAGABINE HYDROCHLORIDE 4 MG/1
4 TABLET, FILM COATED ORAL 2 TIMES DAILY WITH MEALS
Qty: 60 TAB | Refills: 0 | Status: SHIPPED | OUTPATIENT
Start: 2017-07-10 | End: 2018-02-13 | Stop reason: SDUPTHER

## 2017-07-10 NOTE — TELEPHONE ENCOUNTER
Last Visit: 04/24/2017 with MD Antwan Maurer    Next Appointment: 07/24/2017 with MD Antwan Maurer; Provider Cancellation 07/14  Previous Refill Encounters: 04/14/2017 per MD Antwan Maurer #[id number] with 2 refills     Requested Prescriptions     Pending Prescriptions Disp Refills    tiaGABine (GABITRIL) 4 mg tablet 60 Tab 0     Sig: Take 1 Tab by mouth two (2) times daily (with meals).

## 2017-08-11 ENCOUNTER — OFFICE VISIT (OUTPATIENT)
Dept: ORTHOPEDIC SURGERY | Age: 72
End: 2017-08-11

## 2017-08-11 VITALS
SYSTOLIC BLOOD PRESSURE: 122 MMHG | HEIGHT: 62 IN | HEART RATE: 81 BPM | DIASTOLIC BLOOD PRESSURE: 68 MMHG | RESPIRATION RATE: 18 BRPM | WEIGHT: 186.8 LBS | BODY MASS INDEX: 34.37 KG/M2

## 2017-08-11 DIAGNOSIS — M54.14 RADICULOPATHY, THORACIC REGION: ICD-10-CM

## 2017-08-11 DIAGNOSIS — M54.17 RADICULOPATHY, LUMBOSACRAL REGION: ICD-10-CM

## 2017-08-11 DIAGNOSIS — M96.1 POSTLAMINECTOMY SYNDROME, LUMBAR: Primary | ICD-10-CM

## 2017-08-11 RX ORDER — TIAGABINE HYDROCHLORIDE 4 MG/1
4 TABLET, FILM COATED ORAL 2 TIMES DAILY WITH MEALS
Qty: 60 TAB | Refills: 5 | Status: SHIPPED | OUTPATIENT
Start: 2017-08-11 | End: 2018-02-13 | Stop reason: SDUPTHER

## 2017-08-11 NOTE — PROGRESS NOTES
[facility] SPECIALISTS  [address], [address]  Phone: [phone]  Fax: [phone]        PROGRESS NOTE      HISTORY OF PRESENT ILLNESS:  The patient is a 70 y.o. female and was seen today for follow up of chronic progressive low back pain into the BLE extending in roughly an L4/L5 distribution to the knee. Pain is exacerbated with standing and walking. Symptoms consistent for stenosis. Pt states she is not pain free but notes symptoms are less frequent than before. This is a patient with lumbar postlaminectomy syndrome with a previous h/o L3-S1 fusion performed by Dr. Mariano Andrade on 8/22/2011. She underwent right shoulder replacement in May 2014 and attended therapy for this. Overall, her mobility decreased since and reports she has had other issues that quite frankly have distracted her. Pt states she uses her rollator occasionally with benefit. She has h/o rheumatoid arthritis and is on chronic Prednisone. Pt is followed by Dr. Mello Burton. Pt reports intolerance to higher doses of NEURONTIN 300 mg 1 tab qhs. She is not a candidate for Topamax due to h/o glaucoma. Pt is unable to afford Lyrica. She is prescribed Cymbalta 60 mg by another physician for depression. Preliminary reading of lumbar plain films revealed posterior fusion L3-S1. Hardware intact. Fusion appears to be solid. Lumbar spine MRI dated 1/20/17 was reviewed. Per report, able multilevel caudal lumbar fusion. Diffuse bulging disc annulus at L4/L5 results in mild to moderate symmetric soft tissue exit foramen stenosis at this level. Fusion hardware is well anchored, fusion appears solid. Mild bulging disc annulus, above fusion level at L2/L3, without significant interval progression since the prior exam. 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 At her last clinical appointment, I offered to increase her Gabitril 4 mg dose to TID. She declined at that time.  Patient wished to continue her current treatment. She was provided with refills of Gabitril 4 mg BID. Patient should proceed with left TKR as scheduled for June. The patient returns today with pain location and distribution remain unchanged. She rates pain 4/10, a slight increase since her last visit (3/10). Note from Dr. Jason Becerra dated 6/14/17 indicating patient was seen for f/u rheumatoid arthritis without organ or system involvement, with positive rheumatoid factor. Of note, she is on Prednisone 5 mg and is also treated with Norco. Pt apparently never underwent left TKR which was scheduled on 6/23/17 with Dr. Pascale Menjivar. She is compliant with Gabitril 4 mg BID.  reviewed. Past Medical History:   Diagnosis Date    Anxiety     Arthritis     Autoimmune disease ([facility] [address]     rheumatoid arthritis    Bronchiectasis ([facility] [address]     Cataract     Depression     Glaucoma     Hypertension     Stroke [facility])         Social History     Social History    Marital status:      Spouse name: N/A    Number of children: N/A    Years of education: N/A     Occupational History    Not on file. Social History Main Topics    Smoking status: Former Smoker     Packs/day: 1.00     Years: 25.00     Types: Cigarettes     Quit date: 4/3/1996    Smokeless tobacco: Never Used    Alcohol use No    Drug use: No    Sexual activity: Not on file     Other Topics Concern    Not on file     Social History Narrative       Current Outpatient Prescriptions   Medication Sig Dispense Refill    tiaGABine (GABITRIL) 4 mg tablet Take 1 Tab by mouth two (2) times daily (with meals). 60 Tab 0    diphenhydrAMINE-acetaminophen (TYLENOL PM EXTRA STRENGTH)  mg tab Take  by mouth.  amLODIPine (NORVASC) 5 mg tablet       metoprolol succinate (TOPROL-XL) 100 mg tablet       certolizumab pegol (CIMZIA) 400 mg/2 mL (200 mg/mL x 2) sykt injection by SubCUTAneous route once.  predniSONE (DELTASONE) 5 mg tablet Take  by mouth daily.       CHOLECALCIFEROL, VITAMIN D3, (DIALYVITE VITAMIN D PO) Take  by mouth.  HYDROcodone-acetaminophen (NORCO) 5-325 mg per tablet Take  by mouth.  DORZOLAMIDE HCL/TIMOLOL MALEAT (COSOPT OP) Apply 1 Drop to eye two (2) times a day. Right eye      latanoprost (XALATAN) 0.005 % ophthalmic solution Administer 1 Drop to right eye nightly.  omeprazole (PRILOSEC) 40 mg capsule Take 40 mg by mouth daily.  losartan (COZAAR) 100 mg tablet Take 100 mg by mouth daily.  pravastatin (PRAVACHOL) 40 mg tablet Take 40 mg by mouth nightly.  cloNIDine (CATAPRES) 0.1 mg tablet Take  by mouth two (2) times a day.  DULoxetine (CYMBALTA) 60 mg capsule Take 60 mg by mouth daily.  bimatoprost (LUMIGAN) 0.03 % ophthalmic drops Administer 1 Drop to right eye every evening.  aspirin 81 mg tablet Take 162 mg by mouth.  pregabalin (LYRICA) 50 mg capsule Take 1 Cap by mouth two (2) times a day. Max Daily Amount: 100 mg. (Patient not taking: Reported on 8/11/2017) 14 Cap 0    METHOTREXATE by Does Not Apply route. 6 tabs weekly      albuterol (PROVENTIL HFA, VENTOLIN HFA, PROAIR HFA) 90 mcg/actuation inhaler Take  by inhalation.  gabapentin (NEURONTIN) 300 mg capsule Take 300 mg by mouth three (3) times daily. Allergies   Allergen Reactions    Combigan [Brimonidine-Timolol] Other (comments)     redness and irritation in eyes    Enbrel [Etanercept] Rash and Swelling    Morphine Unknown (comments)     Makes patient sick        Ambulates with a single point cane. PHYSICAL EXAMINATION    Visit Vitals    /68    Pulse 81    Resp 18    Ht 5' 2\" (1.575 m)    Wt 186 lb 12.8 oz (84.7 kg)    BMI 34.17 kg/m2       CONSTITUTIONAL: NAD, A&O x 3  SENSATION: Intact to light touch throughout  RANGE OF MOTION: The patient has full passive range of motion in all four extremities.   MOTOR:  Straight Leg Raise: Negative, bilateral               Hip Flex Knee Ext Knee Flex Ankle DF GTE Ankle PF Tone   Right +4/5 +4/5 +4/5 +4/5 +4/5 +4/5 +4/5   Left +4/5 +4/5 +4/5 +4/5 +4/5 +4/5 +4/5       ASSESSMENT   Diagnoses and all orders for this visit:    1. Postlaminectomy syndrome, lumbar    2. Radiculopathy, lumbosacral region    3. Radiculopathy, thoracic region          IMPRESSION AND PLAN:  Patient wished to continue her current treatment. She was provided with refills of her Gabitril 4 mg BID. I will see the patient back in 6 month's time or earlier if needed. Written by Jake Sheikh, as dictated by Victoria Levi MD  I examined the patient, reviewed and agree with the note.

## 2017-08-11 NOTE — MR AVS SNAPSHOT
Visit Information Date & Time Provider Department Dept. Phone Encounter #  
 8/11/2017 11:20 AM Bean Soares  [facility], [address] and [facility] - Joy Guevara [phone] Follow-up Instructions Return in about 6 months (around 2/11/2018). Upcoming Health Maintenance Date Due Hepatitis C Screening 1945 DTaP/Tdap/Td series (1 - Tdap) 12/8/1966 BREAST CANCER SCRN MAMMOGRAM 12/8/1995 FOBT Q 1 YEAR AGE 50-75 12/8/1995 ZOSTER VACCINE AGE 60> 10/8/2005 GLAUCOMA SCREENING Q2Y 12/8/2010 OSTEOPOROSIS SCREENING (DEXA) 12/8/2010 Pneumococcal 65+ Low/Medium Risk (1 of 2 - PCV13) 12/8/2010 MEDICARE YEARLY EXAM 12/8/2010 INFLUENZA AGE 9 TO ADULT 8/1/2017 Allergies as of 8/11/2017  Review Complete On: 8/11/2017 By: Bean Soares MD  
  
 Severity Noted Reaction Type Reactions Combigan [Brimonidine-timolol]  01/07/2016    Other (comments)  
 redness and irritation in eyes Enbrel [Etanercept]  01/07/2016    Rash, Swelling Morphine  04/03/2013    Unknown (comments) Makes patient sick Current Immunizations  Never Reviewed No immunizations on file. Not reviewed this visit You Were Diagnosed With   
  
 Codes Comments Postlaminectomy syndrome, lumbar    -  Primary ICD-10-CM: M96.1 ICD-9-CM: 722.83 Radiculopathy, lumbosacral region     ICD-10-CM: M54.17 ICD-9-CM: 724.4 Radiculopathy, thoracic region     ICD-10-CM: M54.14 
ICD-9-CM: 724.4 Vitals BP Pulse Resp Height(growth percentile) Weight(growth percentile) BMI  
 122/68 81 18 5' 2\" (1.575 m) 186 lb 12.8 oz (84.7 kg) 34.17 kg/m2 OB Status Smoking Status Postmenopausal Former Smoker BMI and BSA Data Body Mass Index Body Surface Area  
 34.17 kg/m 2 1.92 m 2 Preferred Pharmacy Pharmacy Name Phone  [address] [facility] [address] [facility] [address] [phone] Your Updated Medication List  
  
   
This list is accurate as of: 8/11/17 11:59 AM.  Always use your most recent med list.  
  
  
  
  
 albuterol 90 mcg/actuation inhaler Commonly known as:  PROVENTIL HFA, VENTOLIN HFA, PROAIR HFA Take  by inhalation. amLODIPine 5 mg tablet Commonly known as:  NORVASC  
  
 aspirin 81 mg tablet Take 162 mg by mouth. certolizumab pegol 637 mg/2 mL (200 mg/mL x 2) Sykt injection Commonly known as:  CIMZIA  
by SubCUTAneous route once. cloNIDine HCl 0.1 mg tablet Commonly known as:  CATAPRES Take  by mouth two (2) times a day. COSOPT OP Apply 1 Drop to eye two (2) times a day. Right eye COZAAR 100 mg tablet Generic drug:  losartan Take 100 mg by mouth daily. CYMBALTA 60 mg capsule Generic drug:  DULoxetine Take 60 mg by mouth daily. DIALYVITE VITAMIN D PO Take  by mouth.  
  
 gabapentin 300 mg capsule Commonly known as:  NEURONTIN Take 300 mg by mouth three (3) times daily. HYDROcodone-acetaminophen 5-325 mg per tablet Commonly known as:  Landry Boot Take  by mouth.  
  
 latanoprost 0.005 % ophthalmic solution Commonly known as:  Lana Shanna Administer 1 Drop to right eye nightly. LUMIGAN 0.03 % ophthalmic drops Generic drug:  bimatoprost  
Administer 1 Drop to right eye every evening. METHOTREXATE  
by Does Not Apply route. 6 tabs weekly  
  
 metoprolol succinate 100 mg tablet Commonly known as:  TOPROL-XL  
  
 omeprazole 40 mg capsule Commonly known as:  PRILOSEC Take 40 mg by mouth daily. pravastatin 40 mg tablet Commonly known as:  PRAVACHOL Take 40 mg by mouth nightly. predniSONE 5 mg tablet Commonly known as:  Dalbert Romeo Take  by mouth daily. pregabalin 50 mg capsule Commonly known as:  Temple Jewel Take 1 Cap by mouth two (2) times a day. Max Daily Amount: 100 mg.  
  
 * tiaGABine 4 mg tablet Commonly known as:  GABITRIL Take 1 Tab by mouth two (2) times daily (with meals). * tiaGABine 4 mg tablet Commonly known as:  GABITRIL Take 1 Tab by mouth two (2) times daily (with meals). TYLENOL PM EXTRA STRENGTH  mg Tab Generic drug:  diphenhydrAMINE-acetaminophen Take  by mouth. * Notice: This list has 2 medication(s) that are the same as other medications prescribed for you. Read the directions carefully, and ask your doctor or other care provider to review them with you. Prescriptions Sent to Pharmacy Refills  
 tiaGABine (GABITRIL) 4 mg tablet 5 Sig: Take 1 Tab by mouth two (2) times daily (with meals). Class: Normal  
 Pharmacy: [address] [place] [address] #: [phone] Route: Oral  
  
Follow-up Instructions Return in about 6 months (around 2/11/2018). Introducing \[facility]\"" & HEALTH SERVICES! [facility] introduces [employer] patient portal. Now you can access parts of your medical record, email your doctor's office, and request medication refills online. 1. In your internet browser, go to [email or web address] 2. Click on the First Time User? Click Here link in the Sign In box. You will see the New Member Sign Up page. 3. Enter your [employer] Access Code exactly as it appears below. You will not need to use this code after youve completed the sign-up process. If you do not sign up before the expiration date, you must request a new code. · [employer] Access Code: [email or web address] Expires: 11/9/2017 11:11 AM 
 
4. Enter the last four digits of your Social Security Number (xxxx) and Date of Birth (mm/dd/yyyy) as indicated and click Submit. You will be taken to the next sign-up page. 5. Create a clickTRUEt ID. This will be your click[employer] login ID and cannot be changed, so think of one that is secure and easy to remember. 6. Create a [employer] password. You can change your password at any time. 7. Enter your Password Reset Question and Answer. This can be used at a later time if you forget your password. 8. Enter your e-mail address. You will receive e-mail notification when new information is available in [address] 9. Click Sign Up. You can now view and download portions of your medical record. 10. Click the Download Summary menu link to download a portable copy of your medical information. If you have questions, please visit the Frequently Asked Questions section of the [employer] website. Remember, [employer] is NOT to be used for urgent needs. For medical emergencies, dial 911. Now available from your iPhone and Android! Please provide this summary of care documentation to your next provider. Your primary care clinician is listed as Malia Jin. If you have any questions after today's visit, please call [phone].

## 2017-08-23 ENCOUNTER — HOSPITAL ENCOUNTER (OUTPATIENT)
Dept: GENERAL RADIOLOGY | Age: 72
Discharge: HOME | End: 2017-08-23
Payer: MEDICARE

## 2017-08-23 DIAGNOSIS — M05.79 RHEUMATOID ARTHRITIS OF MULTIPLE SITES WITHOUT ORGAN OR SYSTEM INVOLVEMENT WITH POSITIVE RHEUMATOID FACTOR (HCC): ICD-10-CM

## 2017-08-23 PROCEDURE — 71020 XR CHEST PA LAT: CPT

## 2018-02-13 ENCOUNTER — OFFICE VISIT (OUTPATIENT)
Dept: ORTHOPEDIC SURGERY | Age: 73
End: 2018-02-13

## 2018-02-13 VITALS
DIASTOLIC BLOOD PRESSURE: 71 MMHG | HEIGHT: 62 IN | SYSTOLIC BLOOD PRESSURE: 134 MMHG | BODY MASS INDEX: 33.68 KG/M2 | HEART RATE: 94 BPM | WEIGHT: 183 LBS

## 2018-02-13 DIAGNOSIS — M96.1 POSTLAMINECTOMY SYNDROME, LUMBAR: Primary | ICD-10-CM

## 2018-02-13 DIAGNOSIS — M54.17 RADICULOPATHY, LUMBOSACRAL REGION: ICD-10-CM

## 2018-02-13 DIAGNOSIS — M54.14 RADICULOPATHY, THORACIC REGION: ICD-10-CM

## 2018-02-13 RX ORDER — TIAGABINE HYDROCHLORIDE 4 MG/1
4 TABLET, FILM COATED ORAL 2 TIMES DAILY WITH MEALS
Qty: 60 TAB | Refills: 5 | Status: SHIPPED | OUTPATIENT
Start: 2018-02-13 | End: 2018-02-13 | Stop reason: SDUPTHER

## 2018-02-13 RX ORDER — TIAGABINE HYDROCHLORIDE 4 MG/1
4 TABLET, FILM COATED ORAL 2 TIMES DAILY WITH MEALS
Qty: 60 TAB | Refills: 5 | Status: SHIPPED | OUTPATIENT
Start: 2018-02-13 | End: 2018-04-25

## 2018-02-13 NOTE — MR AVS SNAPSHOT
Pauline Silverman 
 
 
 [address] [facility] 
[phone] Patient: Luisito Lenz MRN: [id number] :1945 Visit Information Date & Time Provider Department Dept. Phone Encounter #  
 2018 11:20 AM Abelardo Rivas MD [address] [place] [address], [address] [facility] [phone] [id number] Follow-up Instructions Return in about 6 months (around 2018). Follow-up and Disposition History Upcoming Health Maintenance Date Due Hepatitis C Screening 1945 DTaP/Tdap/Td series (1 - Tdap) 1966 BREAST CANCER SCRN MAMMOGRAM 1995 FOBT Q 1 YEAR AGE 50-75 1995 ZOSTER VACCINE AGE 60> 10/8/2005 GLAUCOMA SCREENING Q2Y 2010 OSTEOPOROSIS SCREENING (DEXA) 2010 Pneumococcal 65+ Low/Medium Risk (1 of 2 - PCV13) 2010 MEDICARE YEARLY EXAM 2010 Influenza Age 5 to Adult 2017 Allergies as of 2018  Review Complete On: 2018 By: Abelardo Rivas MD  
  
 Severity Noted Reaction Type Reactions Combigan [Brimonidine-timolol]  2016    Other (comments)  
 redness and irritation in eyes Enbrel [Etanercept]  2016    Rash, Swelling Morphine  2013    Unknown (comments) Makes patient sick Current Immunizations  Never Reviewed No immunizations on file. Not reviewed this visit You Were Diagnosed With   
  
 Codes Comments Postlaminectomy syndrome, lumbar    -  Primary ICD-10-CM: M96.1 ICD-9-CM: 722.83 Radiculopathy, lumbosacral region     ICD-10-CM: M54.17 ICD-9-CM: 724.4 Radiculopathy, thoracic region     ICD-10-CM: M54.14 
ICD-9-CM: 724.4 Vitals BP Pulse Height(growth percentile) Weight(growth percentile) BMI OB Status 134/71 94 5' 2\" (1.575 m) 183 lb (83 kg) 33.47 kg/m2 Postmenopausal  
 Smoking Status Former Smoker Vitals History BMI and BSA Data Body Mass Index Body Surface Area  
 33.47 kg/m 2 1.91 m 2 Preferred Pharmacy Pharmacy Name Phone [facility] #[id number] [facility], [address] [place] [address],[address] [facility] [phone] Your Updated Medication List  
  
   
This list is accurate as of: 2/13/18 12:00 PM.  Always use your most recent med list.  
  
  
  
  
 albuterol 90 mcg/actuation inhaler Commonly known as:  PROVENTIL HFA, VENTOLIN HFA, PROAIR HFA Take  by inhalation. amLODIPine 5 mg tablet Commonly known as:  NORVASC  
  
 aspirin 81 mg tablet Take 162 mg by mouth. certolizumab pegol 985 mg/2 mL (200 mg/mL x 2) Sykt injection Commonly known as:  CIMZIA  
by SubCUTAneous route once. cloNIDine HCl 0.1 mg tablet Commonly known as:  CATAPRES Take  by mouth two (2) times a day. COSOPT OP Apply 1 Drop to eye two (2) times a day. Right eye COZAAR 100 mg tablet Generic drug:  losartan Take 100 mg by mouth daily. CYMBALTA 60 mg capsule Generic drug:  DULoxetine Take 60 mg by mouth daily. DIALYVITE VITAMIN D PO Take  by mouth.  
  
 gabapentin 300 mg capsule Commonly known as:  NEURONTIN Take 300 mg by mouth three (3) times daily. HYDROcodone-acetaminophen 5-325 mg per tablet Commonly known as:  Ferne Arrow Take  by mouth.  
  
 latanoprost 0.005 % ophthalmic solution Commonly known as:  Virlinda Seeds Administer 1 Drop to right eye nightly. LUMIGAN 0.03 % ophthalmic drops Generic drug:  bimatoprost  
Administer 1 Drop to right eye every evening. METHOTREXATE  
by Does Not Apply route. 6 tabs weekly  
  
 metoprolol succinate 100 mg tablet Commonly known as:  TOPROL-XL  
  
 omeprazole 40 mg capsule Commonly known as:  PRILOSEC Take 40 mg by mouth daily. pravastatin 40 mg tablet Commonly known as:  PRAVACHOL Take 40 mg by mouth nightly. predniSONE 5 mg tablet Commonly known as:  Gretchen Cole Take  by mouth daily. pregabalin 50 mg capsule Commonly known as:  Ruelaldo Jose Take 1 Cap by mouth two (2) times a day. Max Daily Amount: 100 mg.  
  
 tiaGABine 4 mg tablet Commonly known as:  GABITRIL Take 1 Tab by mouth two (2) times daily (with meals). TYLENOL PM EXTRA STRENGTH  mg Tab Generic drug:  diphenhydrAMINE-acetaminophen Take  by mouth. Prescriptions Sent to Pharmacy Refills  
 tiaGABine (GABITRIL) 4 mg tablet 5 Sig: Take 1 Tab by mouth two (2) times daily (with meals). Class: Normal  
 Pharmacy: [facility] [address] [place] [address],[address] #: [phone] Route: Oral  
  
Follow-up Instructions Return in about 6 months (around 8/13/2018). Introducing [facility]! Chon Moeller introduces MedPassage patient portal. Now you can access parts of your medical record, email your doctor's office, and request medication refills online. 1. In your internet browser, go to [email or web address] 2. Click on the First Time User? Click Here link in the Sign In box. You will see the New Member Sign Up page. 3. Enter your MedPassage Access Code exactly as it appears below. You will not need to use this code after youve completed the sign-up process. If you do not sign up before the expiration date, you must request a new code. · MedPassage Access Code: [email or web address] Expires: 5/14/2018 11:09 AM 
 
4. Enter the last four digits of your Social Security Number (xxxx) and Date of Birth (mm/dd/yyyy) as indicated and click Submit. You will be taken to the next sign-up page. 5. Create a MeeDoct ID. This will be your MedPassage login ID and cannot be changed, so think of one that is secure and easy to remember. 6. Create a MeeDoct password. You can change your password at any time. 7. Enter your Password Reset Question and Answer. This can be used at a later time if you forget your password. 8. Enter your e-mail address. You will receive e-mail notification when new information is available in [address] 9. Click Sign Up. You can now view and download portions of your medical record. 10. Click the Download Summary menu link to download a portable copy of your medical information. If you have questions, please visit the Frequently Asked Questions section of the [employer] website. Remember, [employer] is NOT to be used for urgent needs. For medical emergencies, dial 911. Now available from your iPhone and Android! Please provide this summary of care documentation to your next provider. Your primary care clinician is listed as Thom Ernst. If you have any questions after today's visit, please call [phone].

## 2018-02-13 NOTE — PROGRESS NOTES
[facility]  [address]  [address], [address]   Phone: [phone]  Fax: [phone]        PROGRESS NOTE      HISTORY OF PRESENT ILLNESS:  The patient is a 67 y.o. female and was seen today for follow up of Pain is exacerbated with standing and walking. Symptoms consistent for stenosis. Pt states she is not pain free but notes symptoms are less frequent than before. This is a patient with lumbar postlaminectomy syndrome with a previous h/o L3-S1 fusion performed by Dr. Mary Burch on 8/22/2011. She underwent right shoulder replacement in May 2014 and attended therapy for this. Overall, her mobility decreased since and reports she has had other issues that quite frankly have distracted her. Pt states she uses her rollator occasionally with benefit. She has h/o rheumatoid arthritis and is on chronic Prednisone. Pt is followed by Dr. Jenny Gilbert. Note from Dr. Jamison Becerra dated 6/14/17 indicating patient was seen for f/u rheumatoid arthritis without organ or system involvement, with positive rheumatoid factor. Of note, she is on Prednisone 5 mg and is also treated with Norco. Pt apparently never underwent left TKR which was scheduled on 6/23/17 with Dr. Satya Hale. Pt reports intolerance to higher doses of NEURONTIN 300 mg 1 tab qhs. She is not a candidate for Topamax due to h/o glaucoma. Pt is unable to afford Lyrica. She is prescribed Cymbalta 60 mg by another physician for depression. Preliminary reading of lumbar plain films revealed posterior fusion L3-S1. Hardware intact. Fusion appears to be solid. Lumbar spine MRI dated 1/20/17 was reviewed. Per report, able multilevel caudal lumbar fusion. Diffuse bulging disc annulus at L4/L5 results in mild to moderate symmetric soft tissue exit foramen stenosis at this level. Fusion hardware is well anchored, fusion appears solid.  Mild bulging disc annulus, above fusion level at L2/L3, without significant interval progression since the prior exam. 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 At her last clinical appointment, patient wished to continue her current treatment. She was provided with refills of her Gabitril 4 mg BID. The patient returns today patient reports her symptoms were tolerable until 1 month ago as she experienced widespread pain. She rates pain 5/10, an increase since her lat visit (4/10). Patient continues taking chronic Prednisone secondary to her rheumatoid arthritis. Patient continues taking Cymbalta as rx by another physician. She is compliant with Gabitril 4 mg BID with benefit, however; she states she may not longer be able to afford it. She requested if she can take Gabitril 4 mg once a day instead of BID.  reviewed. Body mass index is 33.47 kg/(m^2). Past Medical History:   Diagnosis Date    Anxiety     Arthritis     Autoimmune disease ([facility]     rheumatoid arthritis    Bronchiectasis ([facility]     Cataract     Depression     Glaucoma     Hypertension     Stroke [facility])         Social History     Social History    Marital status:      Spouse name: N/A    Number of children: N/A    Years of education: N/A     Occupational History    Not on file. Social History Main Topics    Smoking status: Former Smoker     Packs/day: 1.00     Years: 25.00     Types: Cigarettes     Quit date: 4/3/1996    Smokeless tobacco: Never Used    Alcohol use No    Drug use: No    Sexual activity: Not on file     Other Topics Concern    Not on file     Social History Narrative       Current Outpatient Prescriptions   Medication Sig Dispense Refill    tiaGABine (GABITRIL) 4 mg tablet Take 1 Tab by mouth two (2) times daily (with meals). 60 Tab 5    diphenhydrAMINE-acetaminophen (TYLENOL PM EXTRA STRENGTH)  mg tab Take  by mouth.  pregabalin (LYRICA) 50 mg capsule Take 1 Cap by mouth two (2) times a day.  Max Daily Amount: 100 mg. 14 Cap 0    amLODIPine (NORVASC) 5 mg tablet  metoprolol succinate (TOPROL-XL) 100 mg tablet       certolizumab pegol (CIMZIA) 400 mg/2 mL (200 mg/mL x 2) sykt injection by SubCUTAneous route once.  predniSONE (DELTASONE) 5 mg tablet Take  by mouth daily.  CHOLECALCIFEROL, VITAMIN D3, (DIALYVITE VITAMIN D PO) Take  by mouth.  HYDROcodone-acetaminophen (NORCO) 5-325 mg per tablet Take  by mouth.  DORZOLAMIDE HCL/TIMOLOL MALEAT (COSOPT OP) Apply 1 Drop to eye two (2) times a day. Right eye      latanoprost (XALATAN) 0.005 % ophthalmic solution Administer 1 Drop to right eye nightly.  albuterol (PROVENTIL HFA, VENTOLIN HFA, PROAIR HFA) 90 mcg/actuation inhaler Take  by inhalation.  omeprazole (PRILOSEC) 40 mg capsule Take 40 mg by mouth daily.  losartan (COZAAR) 100 mg tablet Take 100 mg by mouth daily.  pravastatin (PRAVACHOL) 40 mg tablet Take 40 mg by mouth nightly.  cloNIDine (CATAPRES) 0.1 mg tablet Take  by mouth two (2) times a day.  DULoxetine (CYMBALTA) 60 mg capsule Take 60 mg by mouth daily.  bimatoprost (LUMIGAN) 0.03 % ophthalmic drops Administer 1 Drop to right eye every evening.  aspirin 81 mg tablet Take 162 mg by mouth.  METHOTREXATE by Does Not Apply route. 6 tabs weekly      gabapentin (NEURONTIN) 300 mg capsule Take 300 mg by mouth three (3) times daily. Allergies   Allergen Reactions    Combigan [Brimonidine-Timolol] Other (comments)     redness and irritation in eyes    Enbrel [Etanercept] Rash and Swelling    Morphine Unknown (comments)     Makes patient sick        Ambulates with a single point cane. PHYSICAL EXAMINATION    Visit Vitals    /71    Pulse 94    Ht 5' 2\" (1.575 m)    Wt 183 lb (83 kg)    BMI 33.47 kg/m2       CONSTITUTIONAL: NAD, A&O x 3  SENSATION: Intact to light touch throughout  RANGE OF MOTION: The patient has full passive range of motion in all four extremities.   MOTOR:  Straight Leg Raise: Negative, bilateral     Tenderness to palpation of of the left trochanteric bursitis. Hip Flex Knee Ext Knee Flex Ankle DF GTE Ankle PF Tone   Right +4/5 +4/5 +4/5 +4/5 +4/5 +4/5 +4/5   Left +4/5 +4/5 +4/5 +4/5 +4/5 +4/5 +4/5       ASSESSMENT   Diagnoses and all orders for this visit:    1. Postlaminectomy syndrome, lumbar  -     tiaGABine (GABITRIL) 4 mg tablet; Take 1 Tab by mouth two (2) times daily (with meals). 2. Radiculopathy, lumbosacral region  -     tiaGABine (GABITRIL) 4 mg tablet; Take 1 Tab by mouth two (2) times daily (with meals). 3. Radiculopathy, thoracic region  -     tiaGABine (GABITRIL) 4 mg tablet; Take 1 Tab by mouth two (2) times daily (with meals). IMPRESSION AND PLAN:  Patient is not interested in blocks at this time. Patient reports an increased in price of Gabitril and she is afraid she might not be able to afford it. Clinically, patient demonstrated increased tenderness to palpation of the left trochanteric bursitis. Patient wished to continue her current treatment. I refilled her Gabitril 4 mg BID. I will see the patient back in 6 month's time or earlier if needed. Written by Frances Mckeon, as dictated by Raul Mccullough MD  I examined the patient, reviewed and agree with the note.

## 2018-04-25 ENCOUNTER — APPOINTMENT (OUTPATIENT)
Dept: CT IMAGING | Age: 73
End: 2018-04-25
Attending: EMERGENCY MEDICINE
Payer: MEDICARE

## 2018-04-25 ENCOUNTER — HOSPITAL ENCOUNTER (EMERGENCY)
Age: 73
Discharge: HOME | End: 2018-04-26
Attending: EMERGENCY MEDICINE
Payer: MEDICARE

## 2018-04-25 ENCOUNTER — APPOINTMENT (OUTPATIENT)
Dept: GENERAL RADIOLOGY | Age: 73
End: 2018-04-25
Attending: EMERGENCY MEDICINE
Payer: MEDICARE

## 2018-04-25 DIAGNOSIS — R53.83 FATIGUE, UNSPECIFIED TYPE: Primary | ICD-10-CM

## 2018-04-25 LAB
ANION GAP SERPL CALC-SCNC: 9 MMOL/L (ref 3–18)
APPEARANCE UR: CLEAR
BACTERIA URNS QL MICRO: ABNORMAL /HPF
BASOPHILS # BLD: 0.1 K/UL (ref 0–0.06)
BASOPHILS NFR BLD: 1 % (ref 0–2)
BILIRUB UR QL: NEGATIVE
BNP SERPL-MCNC: 88 PG/ML (ref 0–900)
BUN SERPL-MCNC: 9 MG/DL (ref 7–18)
BUN/CREAT SERPL: 14 (ref 12–20)
CALCIUM SERPL-MCNC: 10 MG/DL (ref 8.5–10.1)
CHLORIDE SERPL-SCNC: 99 MMOL/L (ref 100–108)
CK MB CFR SERPL CALC: 2.3 % (ref 0–4)
CK MB SERPL-MCNC: 1 NG/ML (ref 5–25)
CK SERPL-CCNC: 44 U/L (ref 26–192)
CO2 SERPL-SCNC: 27 MMOL/L (ref 21–32)
COLOR UR: YELLOW
CREAT SERPL-MCNC: 0.65 MG/DL (ref 0.6–1.3)
D DIMER PPP FEU-MCNC: 2.48 UG/ML(FEU)
DIFFERENTIAL METHOD BLD: ABNORMAL
EOSINOPHIL # BLD: 0.2 K/UL (ref 0–0.4)
EOSINOPHIL NFR BLD: 3 % (ref 0–5)
EPITH CASTS URNS QL MICRO: ABNORMAL /LPF (ref 0–5)
ERYTHROCYTE [DISTWIDTH] IN BLOOD BY AUTOMATED COUNT: 15.7 % (ref 11.6–14.5)
GLUCOSE SERPL-MCNC: 129 MG/DL (ref 74–99)
GLUCOSE UR STRIP.AUTO-MCNC: NEGATIVE MG/DL
HCT VFR BLD AUTO: 36.5 % (ref 35–45)
HGB BLD-MCNC: 11.1 G/DL (ref 12–16)
HGB UR QL STRIP: NEGATIVE
KETONES UR QL STRIP.AUTO: NEGATIVE MG/DL
LEUKOCYTE ESTERASE UR QL STRIP.AUTO: ABNORMAL
LYMPHOCYTES # BLD: 2.1 K/UL (ref 0.9–3.6)
LYMPHOCYTES NFR BLD: 33 % (ref 21–52)
MCH RBC QN AUTO: 24.4 PG (ref 24–34)
MCHC RBC AUTO-ENTMCNC: 30.4 G/DL (ref 31–37)
MCV RBC AUTO: 80.2 FL (ref 74–97)
MONOCYTES # BLD: 1.2 K/UL (ref 0.05–1.2)
MONOCYTES NFR BLD: 19 % (ref 3–10)
NEUTS SEG # BLD: 2.8 K/UL (ref 1.8–8)
NEUTS SEG NFR BLD: 44 % (ref 40–73)
NITRITE UR QL STRIP.AUTO: NEGATIVE
PH UR STRIP: 7.5 [PH] (ref 5–8)
PLATELET # BLD AUTO: 306 K/UL (ref 135–420)
PMV BLD AUTO: 10.1 FL (ref 9.2–11.8)
POTASSIUM SERPL-SCNC: 3.7 MMOL/L (ref 3.5–5.5)
PROT UR STRIP-MCNC: NEGATIVE MG/DL
RBC # BLD AUTO: 4.55 M/UL (ref 4.2–5.3)
RBC #/AREA URNS HPF: ABNORMAL /HPF (ref 0–5)
SODIUM SERPL-SCNC: 135 MMOL/L (ref 136–145)
SP GR UR REFRACTOMETRY: 1.01 (ref 1–1.03)
TROPONIN I SERPL-MCNC: <0.02 NG/ML (ref 0–0.06)
UROBILINOGEN UR QL STRIP.AUTO: 1 EU/DL (ref 0.2–1)
WBC # BLD AUTO: 6.3 K/UL (ref 4.6–13.2)
WBC URNS QL MICRO: ABNORMAL /HPF (ref 0–4)

## 2018-04-25 PROCEDURE — 80048 BASIC METABOLIC PNL TOTAL CA: CPT | Performed by: EMERGENCY MEDICINE

## 2018-04-25 PROCEDURE — 71045 X-RAY EXAM CHEST 1 VIEW: CPT

## 2018-04-25 PROCEDURE — 99285 EMERGENCY DEPT VISIT HI MDM: CPT

## 2018-04-25 PROCEDURE — 83880 ASSAY OF NATRIURETIC PEPTIDE: CPT | Performed by: EMERGENCY MEDICINE

## 2018-04-25 PROCEDURE — 96360 HYDRATION IV INFUSION INIT: CPT

## 2018-04-25 PROCEDURE — 87086 URINE CULTURE/COLONY COUNT: CPT | Performed by: EMERGENCY MEDICINE

## 2018-04-25 PROCEDURE — 85025 COMPLETE CBC W/AUTO DIFF WBC: CPT | Performed by: EMERGENCY MEDICINE

## 2018-04-25 PROCEDURE — 81001 URINALYSIS AUTO W/SCOPE: CPT | Performed by: PHYSICIAN ASSISTANT

## 2018-04-25 PROCEDURE — 85379 FIBRIN DEGRADATION QUANT: CPT | Performed by: EMERGENCY MEDICINE

## 2018-04-25 PROCEDURE — 93005 ELECTROCARDIOGRAM TRACING: CPT

## 2018-04-25 PROCEDURE — 74011250636 HC RX REV CODE- 250/636: Performed by: EMERGENCY MEDICINE

## 2018-04-25 PROCEDURE — 82550 ASSAY OF CK (CPK): CPT | Performed by: EMERGENCY MEDICINE

## 2018-04-25 PROCEDURE — 71275 CT ANGIOGRAPHY CHEST: CPT

## 2018-04-25 RX ORDER — SODIUM CHLORIDE 9 MG/ML
500 INJECTION, SOLUTION INTRAVENOUS ONCE
Status: COMPLETED | OUTPATIENT
Start: 2018-04-25 | End: 2018-04-25

## 2018-04-25 RX ADMIN — SODIUM CHLORIDE 500 ML: 900 INJECTION, SOLUTION INTRAVENOUS at 21:25

## 2018-04-26 VITALS
RESPIRATION RATE: 16 BRPM | SYSTOLIC BLOOD PRESSURE: 146 MMHG | HEART RATE: 94 BPM | WEIGHT: 182 LBS | BODY MASS INDEX: 33.49 KG/M2 | OXYGEN SATURATION: 97 % | DIASTOLIC BLOOD PRESSURE: 84 MMHG | TEMPERATURE: 98.3 F | HEIGHT: 62 IN

## 2018-04-26 PROCEDURE — 74011636320 HC RX REV CODE- 636/320: Performed by: EMERGENCY MEDICINE

## 2018-04-26 RX ADMIN — IOPAMIDOL 100 ML: 755 INJECTION, SOLUTION INTRAVENOUS at 00:22

## 2018-04-26 NOTE — ED NOTES
I have reviewed discharge instructions with the patient. The patient verbalized understanding. Patient armband removed and shredded    Patient Discharged in stable condition. Patient is awake, alert and oriented x 4, Denies any pain or discomfort.

## 2018-04-26 NOTE — ED NOTES
Oxygen saturation ranging from 90% to 93% x 5 minutes, placed on nasal cannula @ 2 LPM, Dr Shankar Rich notified.

## 2018-04-26 NOTE — ED PROVIDER NOTES
HPI Comments: 9:19 PM Leonora Verma is a 67 y.o. female with h/o HTN, arthritis, and stroke who presents to ED complaining of dizziness, increased urinary frequency, chills, and nausea since yesterday. The pt also reports her urine has a very strong odor. Reports having some flank pain last week; was seen by her PCP who took her urine, says she was told there was some small amounts of blood in her urine which made her PCP concerned for UTI. Was not placed on Abx because she did not follow back due to the flank pain resolving on its own. While presenting reports she has a mild HA that's located at the back of her head. Denies CP, SOB, fever, vomiting, diarrhea, abdominal pain, back pain, dysuria, weakness, and numbness. No other complaints or concerns in the ED. PCP: Odalys Lomeli MD      The history is provided by the patient. No  was used. Past Medical History:   Diagnosis Date    Anxiety     Arthritis     Autoimmune disease (HCC)     rheumatoid arthritis    Bronchiectasis ([facility] [address]     Cataract     Depression     Glaucoma     Hypertension     Stroke [facility])        Past Surgical History:   Procedure Laterality Date    HX APPENDECTOMY      HX ORTHOPAEDIC      lumbar fusion    HX ORTHOPAEDIC      right rotator cuff repair    HX ORTHOPAEDIC      right knee replacement    HX UROLOGICAL      bladder sx         Family History:   Problem Relation Age of Onset    Heart Disease Mother     Cancer Mother     Heart Disease Father     Diabetes Brother     Heart Disease Brother     Glaucoma Brother        Social History     Social History    Marital status:      Spouse name: N/A    Number of children: N/A    Years of education: N/A     Occupational History    Not on file.      Social History Main Topics    Smoking status: Former Smoker     Packs/day: 1.00     Years: 25.00     Types: Cigarettes     Quit date: 4/3/1996    Smokeless tobacco: Never Used    Alcohol use No  Drug use: No    Sexual activity: Not on file     Other Topics Concern    Not on file     Social History Narrative         ALLERGIES: Combigan [brimonidine-timolol]; Enbrel [etanercept]; Kineret [anakinra (obsolete)]; and Morphine    Review of Systems   Constitutional: Positive for chills. Negative for fatigue and fever. HENT: Negative. Negative for sore throat. Eyes: Negative. Respiratory: Negative for cough and shortness of breath. Cardiovascular: Negative for chest pain and palpitations. Gastrointestinal: Positive for nausea. Negative for abdominal pain and vomiting. Genitourinary: Positive for frequency. Negative for dysuria and vaginal pain. Musculoskeletal: Negative. Skin: Negative. Neurological: Positive for dizziness and headaches. Negative for weakness and light-headedness. Psychiatric/Behavioral: Negative. All other systems reviewed and are negative. Vitals:    04/26/18 0030 04/26/18 0045 04/26/18 0100 04/26/18 0115   BP: 156/74 142/81 141/79 146/84   Pulse: 90 96 94 94   Resp: 16 14 15 16   Temp:       SpO2: 95% 97% 96% 97%   Weight:       Height:                Physical Exam   Constitutional: She is oriented to person, place, and time. She appears well-developed. HENT:   Head: Normocephalic. Mouth/Throat: Oropharynx is clear and moist.   Eyes: Pupils are equal, round, and reactive to light. Neck: Normal range of motion. Cardiovascular: Normal rate and normal heart sounds. No murmur heard. Pulmonary/Chest: Effort normal. She has no wheezes. She has no rales. Abdominal: Soft. There is no tenderness. Musculoskeletal: Normal range of motion. She exhibits no edema. Neurological: She is alert and oriented to person, place, and time. Skin: Skin is warm and dry. Nursing note and vitals reviewed.        [facility]      ED Course       Procedures    Vitals:  Patient Vitals for the past 12 hrs:   Temp Pulse Resp BP SpO2   04/26/18 0115 - 94 16 146/84 97 % 04/26/18 0100 - 94 15 141/79 96 %   04/26/18 0045 - 96 14 142/81 97 %   04/26/18 0030 - 90 16 156/74 95 %   04/25/18 2245 - 86 20 144/87 100 %   04/25/18 2230 - 86 20 156/86 100 %   04/25/18 2215 - 73 16 175/88 98 %   04/25/18 2205 - - - - 90 %   04/25/18 2200 - 72 12 147/85 95 %   04/25/18 2145 - 82 15 144/74 97 %   04/25/18 2130 - 91 19 149/83 97 %   04/25/18 2047 98.3 °F (36.8 °C) (!) 101 20 (!) 169/108 98 %         Medications ordered:   Medications   0.9% sodium chloride infusion 500 mL (0 mL IntraVENous IV Completed 4/25/18 2225)   iopamidol (ISOVUE-370) 76 % injection 100 mL (100 mL IntraVENous Given 4/26/18 0022)         Lab findings:  Recent Results (from the past 12 hour(s))   URINALYSIS W/ RFLX MICROSCOPIC    Collection Time: 04/25/18  8:53 PM   Result Value Ref Range    Color YELLOW      Appearance CLEAR      Specific gravity 1.013 1.005 - 1.030      pH (UA) 7.5 5.0 - 8.0      Protein NEGATIVE  NEG mg/dL    Glucose NEGATIVE  NEG mg/dL    Ketone NEGATIVE  NEG mg/dL    Bilirubin NEGATIVE  NEG      Blood NEGATIVE  NEG      Urobilinogen 1.0 0.2 - 1.0 EU/dL    Nitrites NEGATIVE  NEG      Leukocyte Esterase TRACE (A) NEG     URINE MICROSCOPIC ONLY    Collection Time: 04/25/18  8:53 PM   Result Value Ref Range    WBC 0 to 3 0 - 4 /hpf    RBC NONE 0 - 5 /hpf    Epithelial cells 2+ 0 - 5 /lpf    Bacteria FEW (A) NEG /hpf   EKG, 12 LEAD, INITIAL    Collection Time: 04/25/18  9:17 PM   Result Value Ref Range    Ventricular Rate 97 BPM    Atrial Rate 97 BPM    P-R Interval 142 ms    QRS Duration 114 ms    Q-T Interval 384 ms    QTC Calculation (Bezet) 487 ms    Calculated P Axis 73 degrees    Calculated R Axis 50 degrees    Calculated T Axis 59 degrees    Diagnosis       Normal sinus rhythm  Right bundle branch block  Abnormal ECG  When compared with ECG of 10-AUG-2011 11:16,  Vent.  rate has increased BY  37 BPM  Right bundle branch block is now present     CBC WITH AUTOMATED DIFF    Collection Time: 04/25/18 9:27 PM   Result Value Ref Range    WBC 6.3 4.6 - 13.2 K/uL    RBC 4.55 4.20 - 5.30 M/uL    HGB 11.1 (L) 12.0 - 16.0 g/dL    HCT 36.5 35.0 - 45.0 %    MCV 80.2 74.0 - 97.0 FL    MCH 24.4 24.0 - 34.0 PG    MCHC 30.4 (L) 31.0 - 37.0 g/dL    RDW 15.7 (H) 11.6 - 14.5 %    PLATELET 182 500 - 054 K/uL    MPV 10.1 9.2 - 11.8 FL    NEUTROPHILS 44 40 - 73 %    LYMPHOCYTES 33 21 - 52 %    MONOCYTES 19 (H) 3 - 10 %    EOSINOPHILS 3 0 - 5 %    BASOPHILS 1 0 - 2 %    ABS. NEUTROPHILS 2.8 1.8 - 8.0 K/UL    ABS. LYMPHOCYTES 2.1 0.9 - 3.6 K/UL    ABS. MONOCYTES 1.2 0.05 - 1.2 K/UL    ABS. EOSINOPHILS 0.2 0.0 - 0.4 K/UL    ABS. BASOPHILS 0.1 (H) 0.0 - 0.06 K/UL    DF AUTOMATED     METABOLIC PANEL, BASIC    Collection Time: 04/25/18  9:27 PM   Result Value Ref Range    Sodium 135 (L) 136 - 145 mmol/L    Potassium 3.7 3.5 - 5.5 mmol/L    Chloride 99 (L) 100 - 108 mmol/L    CO2 27 21 - 32 mmol/L    Anion gap 9 3.0 - 18 mmol/L    Glucose 129 (H) 74 - 99 mg/dL    BUN 9 7.0 - 18 MG/DL    Creatinine 0.65 0.6 - 1.3 MG/DL    BUN/Creatinine ratio 14 12 - 20      GFR est AA >60 >60 ml/min/1.73m2    GFR est non-AA >60 >60 ml/min/1.73m2    Calcium 10.0 8.5 - 10.1 MG/DL   CARDIAC PANEL,(CK, CKMB & TROPONIN)    Collection Time: 04/25/18  9:27 PM   Result Value Ref Range    CK 44 26 - 192 U/L    CK - MB 1.0 <3.6 ng/ml    CK-MB Index 2.3 0.0 - 4.0 %    Troponin-I, Qt. <0.02 0.00 - 0.06 NG/ML   D DIMER    Collection Time: 04/25/18  9:27 PM   Result Value Ref Range    D DIMER 2.48 (H) <0.46 ug/ml(FEU)   NT-PRO BNP    Collection Time: 04/25/18  9:27 PM   Result Value Ref Range    NT pro-BNP 88 0 - 900 PG/ML           X-Ray, CT or other radiology findings or impressions:  CTA CHEST W OR W WO CONT   Final Result   IMPRESSION:  Negative for pulmonary emboli. No acute aortic abnormality is evident. Patulous distal esophagus or moderate-sized hiatal hernia. Enlarged thyroid   XR CHEST PORT   Final Result   Impression:  1.   No acute infiltrate or effusion.                 Progress notes, Consult notes or additional Procedure notes:   1:18 AM the pt is feeling better after hydration. No other complaints on recheck. Agrees w dc plan. A and o x 3, neuro intact, not c/w cva. Not c/w uti -although cx sent in lt of sx's, cad/pe/ptx/sepsis. No emc. Pt w no sx's at dc, verbalizes understanding of det ret inst given. Disposition:  Diagnosis:   1. Fatigue, unspecified type        Disposition: D/C home    Follow-up Information     Follow up With Details Comments [address] Schedule an appointment as soon as possible for a visit in 1 day or your physician [address] [place] [address]  [phone]           Discharge Medication List as of 4/26/2018  1:22 AM      CONTINUE these medications which have NOT CHANGED    Details   diphenhydrAMINE-acetaminophen (TYLENOL PM EXTRA STRENGTH)  mg tab Take  by mouth., Historical Med      pregabalin (LYRICA) 50 mg capsule Take 1 Cap by mouth two (2) times a day. Max Daily Amount: 100 mg., Print, Disp-14 Cap, R-0      amLODIPine (NORVASC) 5 mg tablet Historical Med      metoprolol succinate (TOPROL-XL) 100 mg tablet Historical Med      certolizumab pegol (CIMZIA) 400 mg/2 mL (200 mg/mL x 2) sykt injection by SubCUTAneous route once., Historical Med      METHOTREXATE by Does Not Apply route. 6 tabs weekly, Historical Med      predniSONE (DELTASONE) 5 mg tablet Take  by mouth daily. , Historical Med      CHOLECALCIFEROL, VITAMIN D3, (DIALYVITE VITAMIN D PO) Take  by mouth., Historical Med      HYDROcodone-acetaminophen (NORCO) 5-325 mg per tablet Take  by mouth., Historical Med      DORZOLAMIDE HCL/TIMOLOL MALEAT (COSOPT OP) Apply 1 Drop to eye two (2) times a day.  Right eye, Historical Med      latanoprost (XALATAN) 0.005 % ophthalmic solution Administer 1 Drop to right eye nightly., Historical Med      albuterol (PROVENTIL HFA, VENTOLIN HFA, PROAIR HFA) 90 mcg/actuation inhaler Take  by inhalation. , Historical Med      gabapentin (NEURONTIN) 300 mg capsule Take 300 mg by mouth three (3) times daily. , Historical Med      omeprazole (PRILOSEC) 40 mg capsule Take 40 mg by mouth daily. , Historical Med      losartan (COZAAR) 100 mg tablet Take 100 mg by mouth daily. , Historical Med      pravastatin (PRAVACHOL) 40 mg tablet Take 40 mg by mouth nightly., Historical Med      cloNIDine (CATAPRES) 0.1 mg tablet Take  by mouth two (2) times a day., Historical Med      bimatoprost (LUMIGAN) 0.03 % ophthalmic drops Administer 1 Drop to right eye every evening., Historical Med      aspirin 81 mg tablet Take 162 mg by mouth., Historical Med               Scribe [email or web address] acting as a scribe for and in the presence of Jayant Hernandez MD      April 25, 2018 at 9:18 PM       Provider Attestation:      I personally performed the services described in the documentation, reviewed the documentation, as recorded by the scribe in my presence, and it accurately and completely records my words and actions.  April 25, 2018 at 9:18 PM - Jayant Hernandez MD

## 2018-04-26 NOTE — DISCHARGE INSTRUCTIONS
Return for pain, fever/chills, shortness of breath, vomiting, decreased fluid intake, weakness, numbness, any confusion, dizziness, or any change or concerns.

## 2018-04-26 NOTE — ED TRIAGE NOTES
Patient states onset of urinary frequency and strong urine odor since yesterday. States not feeling well x 3 days. Patient states discontinuation of cymbalta abruptly 2 weeks.

## 2018-04-27 LAB
ATRIAL RATE: 97 BPM
BACTERIA SPEC CULT: ABNORMAL
CALCULATED P AXIS, ECG09: 73 DEGREES
CALCULATED R AXIS, ECG10: 50 DEGREES
CALCULATED T AXIS, ECG11: 59 DEGREES
DIAGNOSIS, 93000: NORMAL
P-R INTERVAL, ECG05: 142 MS
Q-T INTERVAL, ECG07: 384 MS
QRS DURATION, ECG06: 114 MS
QTC CALCULATION (BEZET), ECG08: 487 MS
SERVICE CMNT-IMP: ABNORMAL
VENTRICULAR RATE, ECG03: 97 BPM

## 2021-03-01 NOTE — ADDENDUM NOTE
Addended by: Isabel Barrett on: 2/13/2018 11:59 AM     Modules accepted: [facility] [Nutrition/ Exercise/ Weight Management] : nutrition, exercise, weight management [Vitamins/Supplements] : vitamins/supplements

## 2021-10-19 NOTE — TELEPHONE ENCOUNTER
Called and informed patient that we had received a prior authorization notification from her pharmacy and we have submitted it to her insurance and once we get a response. Someone will contact her with the outcome. Patient verbalized understanding. Applied